# Patient Record
Sex: FEMALE | Race: WHITE | NOT HISPANIC OR LATINO | Employment: FULL TIME | URBAN - METROPOLITAN AREA
[De-identification: names, ages, dates, MRNs, and addresses within clinical notes are randomized per-mention and may not be internally consistent; named-entity substitution may affect disease eponyms.]

---

## 2023-03-30 ENCOUNTER — HOSPITAL ENCOUNTER (OUTPATIENT)
Dept: DATA CONVERSION | Facility: HOSPITAL | Age: 33
End: 2023-03-30
Attending: INTERNAL MEDICINE

## 2023-03-30 DIAGNOSIS — D12.8 BENIGN NEOPLASM OF RECTUM: ICD-10-CM

## 2023-03-30 DIAGNOSIS — K63.5 POLYP OF COLON: ICD-10-CM

## 2023-03-30 DIAGNOSIS — D12.2 BENIGN NEOPLASM OF ASCENDING COLON: ICD-10-CM

## 2023-03-30 DIAGNOSIS — F41.9 ANXIETY DISORDER, UNSPECIFIED: ICD-10-CM

## 2023-03-30 DIAGNOSIS — F32.A DEPRESSION, UNSPECIFIED: ICD-10-CM

## 2023-03-30 DIAGNOSIS — K64.8 OTHER HEMORRHOIDS: ICD-10-CM

## 2023-03-30 DIAGNOSIS — D12.5 BENIGN NEOPLASM OF SIGMOID COLON: ICD-10-CM

## 2023-03-30 DIAGNOSIS — D12.0 BENIGN NEOPLASM OF CECUM: ICD-10-CM

## 2023-03-30 DIAGNOSIS — G62.9 POLYNEUROPATHY, UNSPECIFIED: ICD-10-CM

## 2023-05-01 LAB
COMPLETE PATHOLOGY REPORT: NORMAL
CONVERTED CLINICAL DIAGNOSIS-HISTORY: NORMAL
CONVERTED FINAL DIAGNOSIS: NORMAL
CONVERTED FINAL REPORT PDF LINK TO COPY AND PASTE: NORMAL
CONVERTED GROSS DESCRIPTION: NORMAL

## 2023-08-23 PROBLEM — R00.0 TACHYCARDIA: Status: ACTIVE | Noted: 2023-08-23

## 2023-08-23 PROBLEM — R30.0 DYSURIA: Status: ACTIVE | Noted: 2023-08-23

## 2023-08-23 PROBLEM — K62.5 RECTAL BLEEDING: Status: ACTIVE | Noted: 2023-08-23

## 2023-08-23 PROBLEM — J30.9 ALLERGIC RHINITIS: Status: ACTIVE | Noted: 2023-08-23

## 2023-08-23 PROBLEM — F41.1 GENERALIZED ANXIETY DISORDER: Status: ACTIVE | Noted: 2023-08-23

## 2023-08-23 PROBLEM — E87.6 HYPOKALEMIA: Status: ACTIVE | Noted: 2023-08-23

## 2023-08-23 PROBLEM — N89.8 VAGINAL DISCHARGE: Status: ACTIVE | Noted: 2023-08-23

## 2023-08-23 PROBLEM — K64.4 EXTERNAL HEMORRHOID: Status: ACTIVE | Noted: 2023-08-23

## 2023-08-23 PROBLEM — R20.2 PARESTHESIAS: Status: ACTIVE | Noted: 2023-08-23

## 2023-08-23 PROBLEM — E55.9 VITAMIN D DEFICIENCY: Status: ACTIVE | Noted: 2023-08-23

## 2023-08-23 RX ORDER — ACETAMINOPHEN 325 MG/1
3 TABLET ORAL EVERY 6 HOURS
COMMUNITY
Start: 2021-12-08 | End: 2024-05-01 | Stop reason: ALTCHOICE

## 2023-08-23 RX ORDER — SODIUM FLUORIDE/POTASSIUM NIT 1.1 %-5 %
PASTE (ML) DENTAL
COMMUNITY
Start: 2022-10-19 | End: 2024-05-01 | Stop reason: ALTCHOICE

## 2023-08-23 RX ORDER — CETIRIZINE HYDROCHLORIDE 10 MG/1
1 TABLET ORAL DAILY
COMMUNITY

## 2023-08-23 RX ORDER — PSYLLIUM HUSK 0.4 G
CAPSULE ORAL
COMMUNITY
End: 2024-05-01 | Stop reason: ALTCHOICE

## 2023-08-23 RX ORDER — L.ACID,FERM,PLA,RHA/B.BIF,LONG 126 MG
TABLET, DELAYED AND EXTENDED RELEASE ORAL
COMMUNITY
Start: 2022-09-20 | End: 2024-05-01 | Stop reason: ALTCHOICE

## 2023-08-23 RX ORDER — IBUPROFEN 600 MG/1
1 TABLET ORAL EVERY 6 HOURS
COMMUNITY
Start: 2021-12-08 | End: 2024-05-01 | Stop reason: ALTCHOICE

## 2023-10-02 ENCOUNTER — TELEPHONE (OUTPATIENT)
Dept: GASTROENTEROLOGY | Facility: HOSPITAL | Age: 33
End: 2023-10-02
Payer: COMMERCIAL

## 2023-10-03 ENCOUNTER — APPOINTMENT (OUTPATIENT)
Dept: GASTROENTEROLOGY | Facility: HOSPITAL | Age: 33
End: 2023-10-03
Payer: COMMERCIAL

## 2023-11-02 ENCOUNTER — PREP FOR PROCEDURE (OUTPATIENT)
Dept: GASTROENTEROLOGY | Facility: HOSPITAL | Age: 33
End: 2023-11-02
Payer: COMMERCIAL

## 2023-11-08 ENCOUNTER — ANESTHESIA EVENT (OUTPATIENT)
Dept: GASTROENTEROLOGY | Facility: HOSPITAL | Age: 33
End: 2023-11-08
Payer: COMMERCIAL

## 2023-11-08 NOTE — ANESTHESIA PREPROCEDURE EVALUATION
Patient: Leah Majano    Procedure Information       Date/Time: 11/09/23 0830    Scheduled providers: Charity Bacon MD; Parminder Francisco MD; ARLINE Bailey    Procedures:       EGD      COLONOSCOPY    Location: [facility]            Relevant Problems   GI   (+) Rectal bleeding      Neuro/Psych   (+) Generalized anxiety disorder       Clinical information reviewed:   Tobacco  Allergies  Meds   Med Hx  Surg Hx   Fam Hx  Soc Hx        NPO/Void Status  Carbonhydrate Drink Given Prior to Surgery? : N  Date of Last Liquid: 11/09/23  Time of Last Liquid: 0245  Date of Last Solid: 11/07/23  Time of Last Solid: 1900  Last Intake Type: Clear fluids  Time of Last Void: 0745           Past Medical History:   Diagnosis Date    Anxiety     Colon polyps     Hemorrhoids       Past Surgical History:   Procedure Laterality Date    BASAL CELL CARCINOMA EXCISION Left     rib cage    COLONOSCOPY  03/2023    x2    DENTAL SURGERY       Social History     Tobacco Use    Smoking status: Never    Smokeless tobacco: Never   Vaping Use    Vaping Use: Never used   Substance Use Topics    Alcohol use: Yes     Alcohol/week: 7.0 standard drinks of alcohol     Types: 7 Glasses of wine per week    Drug use: Never      Current Outpatient Medications   Medication Instructions    acetaminophen (Tylenol) 325 mg tablet 3 tablets, oral, Every 6 hours    cetirizine (ZyrTEC) 10 mg tablet 1 tablet, oral, Daily    ibuprofen 600 mg tablet 1 tablet, oral, Every 6 hours    L.acid,ferm,cornelius,rha-B.bif,long (Controlled Delivery Probiotic) 126 mg (2 billion cell) tablet,delayed and ext.release oral    PreviDent 5000 Enamel Protect 1.1-5 % paste use as directed    psyllium (MetamuciL) 0.4 gram capsule oral      No Known Allergies     Chemistry    Lab Results   Component Value Date/Time     06/07/2021 0930    K 3.5 06/07/2021 0930     (H) 06/07/2021 0930    CO2 20 (L) 06/07/2021 0930    BUN 11 12/07/2021 1412     "CREATININE 0.52 12/07/2021 1412    Lab Results   Component Value Date/Time    CALCIUM 8.8 06/07/2021 0930    ALKPHOS 136 (H) 02/12/2020 1438    AST 15 12/07/2021 1412    ALT 14 12/07/2021 1412    BILITOT 0.4 02/12/2020 1438          Lab Results   Component Value Date/Time    WBC 14.6 (H) 12/07/2021 1412    HGB 12.0 12/07/2021 1412    HCT 36.5 12/07/2021 1412     12/07/2021 1412     No results found for: \"PROTIME\", \"PTT\", \"INR\"  No results found for this or any previous visit (from the past 4464 hour(s)).  No results found for this or any previous visit from the past 1095 days.       Visit Vitals  /81   Pulse 105   Temp 37.2 °C (99 °F) (Temporal)   Resp 15   Ht 1.702 m (5' 7\")   Wt 75 kg (165 lb 5.5 oz)   SpO2 100%   BMI 25.90 kg/m²   Smoking Status Never   BSA 1.88 m²        Anesthesia Evaluation      No history of anesthetic complications   Airway   Mallampati: III  TM distance: >3 FB  Neck ROM: full  Dental - normal exam     Pulmonary     breath sounds clear to auscultation  Cardiovascular     Rhythm: regular  Rate: normal    Neuro/Psych      GI/Hepatic/Renal      Endo/Other    Abdominal  - normal exam     Other findings: Lower wire retainer                 Physical Exam    Airway  Mallampati: III  TM distance: >3 FB  Neck ROM: full     Cardiovascular   Rhythm: regular  Rate: normal     Dental - normal exam     Pulmonary   Breath sounds clear to auscultation     Abdominal - normal exam       Other findings: Lower wire retainer           Anesthesia Plan    ASA 2     MAC     intravenous induction   Anesthetic plan and risks discussed with patient.        "

## 2023-11-09 ENCOUNTER — ANESTHESIA (OUTPATIENT)
Dept: GASTROENTEROLOGY | Facility: HOSPITAL | Age: 33
End: 2023-11-09
Payer: COMMERCIAL

## 2023-11-09 ENCOUNTER — HOSPITAL ENCOUNTER (OUTPATIENT)
Dept: GASTROENTEROLOGY | Facility: HOSPITAL | Age: 33
Discharge: HOME | End: 2023-11-09
Payer: COMMERCIAL

## 2023-11-09 VITALS
HEIGHT: 67 IN | HEART RATE: 62 BPM | SYSTOLIC BLOOD PRESSURE: 106 MMHG | TEMPERATURE: 96.8 F | RESPIRATION RATE: 15 BRPM | BODY MASS INDEX: 25.95 KG/M2 | DIASTOLIC BLOOD PRESSURE: 69 MMHG | WEIGHT: 165.34 LBS | OXYGEN SATURATION: 100 %

## 2023-11-09 DIAGNOSIS — D12.6 BENIGN NEOPLASM OF COLON, UNSPECIFIED: Primary | ICD-10-CM

## 2023-11-09 LAB — PREGNANCY TEST URINE, POC: NEGATIVE

## 2023-11-09 PROCEDURE — A45378 PR COLONOSCOPY,DIAGNOSTIC: Performed by: NURSE ANESTHETIST, CERTIFIED REGISTERED

## 2023-11-09 PROCEDURE — 3700000001 HC GENERAL ANESTHESIA TIME - INITIAL BASE CHARGE

## 2023-11-09 PROCEDURE — 7100000010 HC PHASE TWO TIME - EACH INCREMENTAL 1 MINUTE

## 2023-11-09 PROCEDURE — A45378 PR COLONOSCOPY,DIAGNOSTIC: Performed by: ANESTHESIOLOGY

## 2023-11-09 PROCEDURE — 88305 TISSUE EXAM BY PATHOLOGIST: CPT | Performed by: PATHOLOGY

## 2023-11-09 PROCEDURE — 7100000009 HC PHASE TWO TIME - INITIAL BASE CHARGE

## 2023-11-09 PROCEDURE — 43239 EGD BIOPSY SINGLE/MULTIPLE: CPT | Performed by: INTERNAL MEDICINE

## 2023-11-09 PROCEDURE — 3700000002 HC GENERAL ANESTHESIA TIME - EACH INCREMENTAL 1 MINUTE

## 2023-11-09 PROCEDURE — 45380 COLONOSCOPY AND BIOPSY: CPT | Performed by: INTERNAL MEDICINE

## 2023-11-09 PROCEDURE — 88305 TISSUE EXAM BY PATHOLOGIST: CPT | Mod: TC,AHULAB | Performed by: INTERNAL MEDICINE

## 2023-11-09 PROCEDURE — 88305 TISSUE EXAM BY PATHOLOGIST: CPT | Mod: TC,SUR,AHULAB | Performed by: INTERNAL MEDICINE

## 2023-11-09 PROCEDURE — 2500000004 HC RX 250 GENERAL PHARMACY W/ HCPCS (ALT 636 FOR OP/ED): Performed by: NURSE ANESTHETIST, CERTIFIED REGISTERED

## 2023-11-09 PROCEDURE — 45385 COLONOSCOPY W/LESION REMOVAL: CPT | Performed by: INTERNAL MEDICINE

## 2023-11-09 RX ORDER — MIDAZOLAM HYDROCHLORIDE 1 MG/ML
INJECTION INTRAMUSCULAR; INTRAVENOUS AS NEEDED
Status: DISCONTINUED | OUTPATIENT
Start: 2023-11-09 | End: 2023-11-09

## 2023-11-09 RX ORDER — FENTANYL CITRATE 50 UG/ML
INJECTION, SOLUTION INTRAMUSCULAR; INTRAVENOUS AS NEEDED
Status: DISCONTINUED | OUTPATIENT
Start: 2023-11-09 | End: 2023-11-09

## 2023-11-09 RX ORDER — SODIUM CHLORIDE, SODIUM LACTATE, POTASSIUM CHLORIDE, CALCIUM CHLORIDE 600; 310; 30; 20 MG/100ML; MG/100ML; MG/100ML; MG/100ML
20 INJECTION, SOLUTION INTRAVENOUS CONTINUOUS
Status: DISCONTINUED | OUTPATIENT
Start: 2023-11-09 | End: 2023-11-10 | Stop reason: HOSPADM

## 2023-11-09 RX ORDER — PROPOFOL 10 MG/ML
INJECTION, EMULSION INTRAVENOUS CONTINUOUS PRN
Status: DISCONTINUED | OUTPATIENT
Start: 2023-11-09 | End: 2023-11-09

## 2023-11-09 RX ORDER — PROPOFOL 10 MG/ML
INJECTION, EMULSION INTRAVENOUS AS NEEDED
Status: DISCONTINUED | OUTPATIENT
Start: 2023-11-09 | End: 2023-11-09

## 2023-11-09 RX ADMIN — MIDAZOLAM HYDROCHLORIDE 2 MG: 1 INJECTION, SOLUTION INTRAMUSCULAR; INTRAVENOUS at 08:37

## 2023-11-09 RX ADMIN — PROPOFOL 200 MCG/KG/MIN: 10 INJECTION, EMULSION INTRAVENOUS at 08:42

## 2023-11-09 RX ADMIN — PROPOFOL 100 MG: 10 INJECTION, EMULSION INTRAVENOUS at 08:42

## 2023-11-09 RX ADMIN — SODIUM CHLORIDE, SODIUM LACTATE, POTASSIUM CHLORIDE, AND CALCIUM CHLORIDE: 600; 310; 30; 20 INJECTION, SOLUTION INTRAVENOUS at 08:54

## 2023-11-09 RX ADMIN — FENTANYL CITRATE 100 MCG: 50 INJECTION, SOLUTION INTRAMUSCULAR; INTRAVENOUS at 08:40

## 2023-11-09 RX ADMIN — SODIUM CHLORIDE, SODIUM LACTATE, POTASSIUM CHLORIDE, AND CALCIUM CHLORIDE: 600; 310; 30; 20 INJECTION, SOLUTION INTRAVENOUS at 08:35

## 2023-11-09 ASSESSMENT — PAIN SCALES - GENERAL
PAINLEVEL_OUTOF10: 0 - NO PAIN

## 2023-11-09 ASSESSMENT — COLUMBIA-SUICIDE SEVERITY RATING SCALE - C-SSRS
6. HAVE YOU EVER DONE ANYTHING, STARTED TO DO ANYTHING, OR PREPARED TO DO ANYTHING TO END YOUR LIFE?: NO
2. HAVE YOU ACTUALLY HAD ANY THOUGHTS OF KILLING YOURSELF?: NO
1. IN THE PAST MONTH, HAVE YOU WISHED YOU WERE DEAD OR WISHED YOU COULD GO TO SLEEP AND NOT WAKE UP?: NO

## 2023-11-09 ASSESSMENT — PAIN - FUNCTIONAL ASSESSMENT
PAIN_FUNCTIONAL_ASSESSMENT: 0-10

## 2023-11-09 NOTE — DISCHARGE INSTRUCTIONS

## 2023-11-09 NOTE — ANESTHESIA POSTPROCEDURE EVALUATION
Patient: Leah Majano    Procedure Summary       Date: 11/09/23 Room / Location: [facility]    Anesthesia Start: 0839 Anesthesia Stop: 0923    Procedures:       EGD      COLONOSCOPY Diagnosis:       Benign neoplasm of colon, unspecified      Benign neoplasm of colon, unspecified    Scheduled Providers: Charity Bacon MD; Parminder Francisco MD; ARLINE Bailey; Glo Chavez, RN; Marifer Lezama RN Responsible Provider: Parminder Francisco MD    Anesthesia Type: MAC ASA Status: 2            Anesthesia Type: MAC    Vitals Value Taken Time   /69 11/09/23 0953   Temp 36 °C (96.8 °F) 11/09/23 0922   Pulse 63 11/09/23 0953   Resp 15 11/09/23 0952   SpO2 100 % 11/09/23 0953   Vitals shown include unvalidated device data.    Anesthesia Post Evaluation    Patient location during evaluation: bedside  Patient participation: complete - patient participated  Level of consciousness: awake and alert  Pain management: satisfactory to patient  Airway patency: patent  Cardiovascular status: acceptable  Respiratory status: acceptable  Hydration status: acceptable  Comments: No significant nausea.  No significant complications noted in recovery.        No notable events documented.

## 2023-11-09 NOTE — H&P
"History Of Present Illness  Leah Majano is a 33 y.o. female presenting with MYH polyposis.      Prior colonoscopy with removal of multiple polyps, here for surveillance.     Past Medical History  Past Medical History:   Diagnosis Date    Anxiety     Colon polyps     Hemorrhoids        Surgical History  Past Surgical History:   Procedure Laterality Date    BASAL CELL CARCINOMA EXCISION Left     rib cage    COLONOSCOPY  03/2023    x2    DENTAL SURGERY          Social History  She reports that she has never smoked. She has never used smokeless tobacco. She reports current alcohol use of about 7.0 standard drinks of alcohol per week. She reports that she does not use drugs.    Family History  Family History   Problem Relation Name Age of Onset    Other (CARDIAC DISORDER) Father      Other (CEREBROVASCULAR ACCIDENT) Father          Allergies  Patient has no known allergies.    Review of Systems     Physical Exam     Last Recorded Vitals  Blood pressure 129/81, pulse 105, temperature 37.2 °C (99 °F), temperature source Temporal, resp. rate 15, height 1.702 m (5' 7\"), weight 75 kg (165 lb 5.5 oz), SpO2 100 %.    Relevant Results       Assessment/Plan   Proceed with EGD and colonoscopy.        I spent 5 minutes in the professional and overall care of this patient.      Charity Bacon MD    "

## 2023-11-10 ASSESSMENT — PAIN SCALES - GENERAL: PAINLEVEL_OUTOF10: 0 - NO PAIN

## 2023-11-21 LAB
LABORATORY COMMENT REPORT: NORMAL
PATH REPORT.FINAL DX SPEC: NORMAL
PATH REPORT.GROSS SPEC: NORMAL
PATH REPORT.TOTAL CANCER: NORMAL
RESIDENT REVIEW: NORMAL

## 2024-01-31 PROBLEM — Z01.419 WELL WOMAN EXAM WITH ROUTINE GYNECOLOGICAL EXAM: Status: ACTIVE | Noted: 2024-01-31

## 2024-01-31 NOTE — PROGRESS NOTES
Subjective   Patient ID: Leah Majano is a 33 y.o. female who presents for Annual Exam.  She was last seen 2/9/2022 at postpartum visit after vaginal delivery.  She has genetic mutation that increases her risk of ovarian cancer. She is s/p genetics consult and is also at increased risk for breast and hyroid cancer. She would like referral to high risk breast clinic for consultation. After discussion she declines OCP as vasectomy is her contraception and she has used OCP for greater than 5 years in the past.           Review of Systems   Constitutional:  Negative for activity change.   HENT:  Negative for congestion.    Respiratory:  Negative for apnea and cough.    Cardiovascular:  Negative for chest pain.   Gastrointestinal:  Negative for constipation and diarrhea.   Genitourinary:  Negative for hematuria and vaginal pain.   Musculoskeletal:  Negative for joint swelling.   Neurological:  Negative for dizziness.   Psychiatric/Behavioral:  Negative for agitation.        Past Medical History:   Diagnosis Date    Anxiety     Colon polyps     Hemorrhoids     MYH-associated polyposis (MAP)       Past Surgical History:   Procedure Laterality Date    BASAL CELL CARCINOMA EXCISION Left     rib cage    COLONOSCOPY  03/2023    x2    DENTAL SURGERY        No Known Allergies   Current Outpatient Medications on File Prior to Visit   Medication Sig Dispense Refill    acetaminophen (Tylenol) 325 mg tablet Take 3 tablets (975 mg) by mouth every 6 hours.      cetirizine (ZyrTEC) 10 mg tablet Take 1 tablet (10 mg) by mouth once daily.      ibuprofen 600 mg tablet Take 1 tablet (600 mg) by mouth every 6 hours.      L.acid,ferm,cornelius,rha-B.bif,long (Controlled Delivery Probiotic) 126 mg (2 billion cell) tablet,delayed and ext.release Take by mouth.      PreviDent 5000 Enamel Protect 1.1-5 % paste use as directed      psyllium (MetamuciL) 0.4 gram capsule Take by mouth.       No current facility-administered medications on file  prior to visit.        Objective   Physical Exam  Constitutional:       Appearance: Normal appearance.   Neck:      Thyroid: No thyromegaly.   Cardiovascular:      Rate and Rhythm: Normal rate and regular rhythm.      Heart sounds: Normal heart sounds.   Pulmonary:      Effort: Pulmonary effort is normal.      Breath sounds: Normal breath sounds.   Chest:      Chest wall: No mass.   Breasts:     Right: Normal. No inverted nipple, mass, nipple discharge or skin change.      Left: Normal. No inverted nipple, mass, nipple discharge or skin change.   Abdominal:      General: There is no distension.      Palpations: Abdomen is soft. There is no mass.      Tenderness: There is no abdominal tenderness.   Genitourinary:     General: Normal vulva.      Exam position: Lithotomy position.      Labia:         Right: No rash.         Left: No rash.       Vagina: Normal. No lesions.      Cervix: No friability or lesion.      Uterus: Normal. Not enlarged and not tender.       Adnexa: Right adnexa normal and left adnexa normal.        Right: No mass or tenderness.          Left: No mass or tenderness.     Musculoskeletal:         General: No deformity.      Cervical back: Neck supple.   Lymphadenopathy:      Cervical: No cervical adenopathy.   Skin:     General: Skin is warm and dry.      Findings: No rash.   Neurological:      General: No focal deficit present.      Mental Status: She is alert.   Psychiatric:         Mood and Affect: Mood normal.         Behavior: Behavior is cooperative.         Thought Content: Thought content normal.           Problem List Items Addressed This Visit       Well woman exam with routine gynecological exam - Primary    Overview     Pap and HPV were negative 12/18/2020.         Current Assessment & Plan     Pap was sent with HPV.   Regular exercise and attaining/maintaining a healthy weight is encouraged.   Adequate calcium intake with diet or supplements is encouraged.    We will notify of any  abnormal results.          Increased risk of breast cancer    Overview     Increased risk of breast cancer due to MYH-associated polyposis genetic mutation.          Current Assessment & Plan     Referral is placed to high risk breast clinic.         Relevant Orders    Referral to Breast Surgery

## 2024-01-31 NOTE — ASSESSMENT & PLAN NOTE
Pap was sent with HPV.   Regular exercise and attaining/maintaining a healthy weight is encouraged.   Adequate calcium intake with diet or supplements is encouraged.    We will notify of any abnormal results.

## 2024-02-08 ENCOUNTER — OFFICE VISIT (OUTPATIENT)
Dept: OBSTETRICS AND GYNECOLOGY | Facility: CLINIC | Age: 34
End: 2024-02-08
Payer: COMMERCIAL

## 2024-02-08 VITALS — BODY MASS INDEX: 27.25 KG/M2 | WEIGHT: 174 LBS

## 2024-02-08 DIAGNOSIS — Z01.419 WELL WOMAN EXAM WITH ROUTINE GYNECOLOGICAL EXAM: Primary | ICD-10-CM

## 2024-02-08 DIAGNOSIS — Z91.89 INCREASED RISK OF BREAST CANCER: ICD-10-CM

## 2024-02-08 PROCEDURE — 87624 HPV HI-RISK TYP POOLED RSLT: CPT

## 2024-02-08 PROCEDURE — 1036F TOBACCO NON-USER: CPT | Performed by: OBSTETRICS & GYNECOLOGY

## 2024-02-08 PROCEDURE — 88175 CYTOPATH C/V AUTO FLUID REDO: CPT

## 2024-02-08 PROCEDURE — 99395 PREV VISIT EST AGE 18-39: CPT | Performed by: OBSTETRICS & GYNECOLOGY

## 2024-02-08 PROCEDURE — 88141 CYTOPATH C/V INTERPRET: CPT | Performed by: PATHOLOGY

## 2024-02-08 ASSESSMENT — ENCOUNTER SYMPTOMS
CONSTIPATION: 0
COUGH: 0
JOINT SWELLING: 0
APNEA: 0
AGITATION: 0
ACTIVITY CHANGE: 0
DIZZINESS: 0
DIARRHEA: 0
HEMATURIA: 0

## 2024-05-01 ENCOUNTER — LAB (OUTPATIENT)
Dept: LAB | Facility: LAB | Age: 34
End: 2024-05-01
Payer: COMMERCIAL

## 2024-05-01 ENCOUNTER — OFFICE VISIT (OUTPATIENT)
Dept: PRIMARY CARE | Facility: CLINIC | Age: 34
End: 2024-05-01
Payer: COMMERCIAL

## 2024-05-01 VITALS
BODY MASS INDEX: 27.31 KG/M2 | HEIGHT: 67 IN | OXYGEN SATURATION: 99 % | WEIGHT: 174 LBS | HEART RATE: 91 BPM | DIASTOLIC BLOOD PRESSURE: 84 MMHG | SYSTOLIC BLOOD PRESSURE: 122 MMHG

## 2024-05-01 DIAGNOSIS — F41.1 GAD (GENERALIZED ANXIETY DISORDER): Primary | ICD-10-CM

## 2024-05-01 DIAGNOSIS — D12.6 MYH-ASSOCIATED COLONIC POLYPOSIS (MAP): ICD-10-CM

## 2024-05-01 DIAGNOSIS — Z13.220 ENCOUNTER FOR SCREENING FOR LIPID DISORDER: ICD-10-CM

## 2024-05-01 DIAGNOSIS — Z13.0 SCREENING FOR ENDOCRINE, METABOLIC AND IMMUNITY DISORDER: ICD-10-CM

## 2024-05-01 DIAGNOSIS — Z13.228 SCREENING FOR ENDOCRINE, METABOLIC AND IMMUNITY DISORDER: ICD-10-CM

## 2024-05-01 DIAGNOSIS — Z13.29 SCREENING FOR ENDOCRINE, METABOLIC AND IMMUNITY DISORDER: ICD-10-CM

## 2024-05-01 DIAGNOSIS — Z86.010 HX OF COLONIC POLYPS: ICD-10-CM

## 2024-05-01 PROBLEM — Z86.0100 HX OF COLONIC POLYPS: Status: ACTIVE | Noted: 2024-05-01

## 2024-05-01 PROBLEM — Z86.0100 HX OF COLONIC POLYPS: Chronic | Status: ACTIVE | Noted: 2024-05-01

## 2024-05-01 LAB
ALBUMIN SERPL BCP-MCNC: 4.4 G/DL (ref 3.4–5)
ALP SERPL-CCNC: 45 U/L (ref 33–110)
ALT SERPL W P-5'-P-CCNC: 9 U/L (ref 7–45)
ANION GAP SERPL CALC-SCNC: 10 MMOL/L (ref 10–20)
AST SERPL W P-5'-P-CCNC: 13 U/L (ref 9–39)
BASOPHILS # BLD AUTO: 0.04 X10*3/UL (ref 0–0.1)
BASOPHILS NFR BLD AUTO: 0.6 %
BILIRUB SERPL-MCNC: 0.6 MG/DL (ref 0–1.2)
BUN SERPL-MCNC: 11 MG/DL (ref 6–23)
CALCIUM SERPL-MCNC: 9 MG/DL (ref 8.6–10.3)
CHLORIDE SERPL-SCNC: 103 MMOL/L (ref 98–107)
CHOLEST SERPL-MCNC: 176 MG/DL (ref 0–199)
CHOLESTEROL/HDL RATIO: 2.3
CO2 SERPL-SCNC: 27 MMOL/L (ref 21–32)
CREAT SERPL-MCNC: 0.73 MG/DL (ref 0.5–1.05)
EGFRCR SERPLBLD CKD-EPI 2021: >90 ML/MIN/1.73M*2
EOSINOPHIL # BLD AUTO: 0.15 X10*3/UL (ref 0–0.7)
EOSINOPHIL NFR BLD AUTO: 2.2 %
ERYTHROCYTE [DISTWIDTH] IN BLOOD BY AUTOMATED COUNT: 13.7 % (ref 11.5–14.5)
GLUCOSE SERPL-MCNC: 80 MG/DL (ref 74–99)
HCT VFR BLD AUTO: 41.4 % (ref 36–46)
HDLC SERPL-MCNC: 77 MG/DL
HGB BLD-MCNC: 13 G/DL (ref 12–16)
IMM GRANULOCYTES # BLD AUTO: 0.03 X10*3/UL (ref 0–0.7)
IMM GRANULOCYTES NFR BLD AUTO: 0.4 % (ref 0–0.9)
LDLC SERPL CALC-MCNC: 87 MG/DL
LYMPHOCYTES # BLD AUTO: 2.49 X10*3/UL (ref 1.2–4.8)
LYMPHOCYTES NFR BLD AUTO: 36 %
MCH RBC QN AUTO: 28.4 PG (ref 26–34)
MCHC RBC AUTO-ENTMCNC: 31.4 G/DL (ref 32–36)
MCV RBC AUTO: 90 FL (ref 80–100)
MONOCYTES # BLD AUTO: 0.44 X10*3/UL (ref 0.1–1)
MONOCYTES NFR BLD AUTO: 6.4 %
NEUTROPHILS # BLD AUTO: 3.76 X10*3/UL (ref 1.2–7.7)
NEUTROPHILS NFR BLD AUTO: 54.4 %
NON HDL CHOLESTEROL: 99 MG/DL (ref 0–149)
NRBC BLD-RTO: 0 /100 WBCS (ref 0–0)
PLATELET # BLD AUTO: 224 X10*3/UL (ref 150–450)
POTASSIUM SERPL-SCNC: 4.1 MMOL/L (ref 3.5–5.3)
PROT SERPL-MCNC: 7.4 G/DL (ref 6.4–8.2)
RBC # BLD AUTO: 4.58 X10*6/UL (ref 4–5.2)
SODIUM SERPL-SCNC: 136 MMOL/L (ref 136–145)
TRIGL SERPL-MCNC: 62 MG/DL (ref 0–149)
TSH SERPL-ACNC: 1.41 MIU/L (ref 0.44–3.98)
VIT B12 SERPL-MCNC: 220 PG/ML (ref 211–911)
VLDL: 12 MG/DL (ref 0–40)
WBC # BLD AUTO: 6.9 X10*3/UL (ref 4.4–11.3)

## 2024-05-01 PROCEDURE — 85025 COMPLETE CBC W/AUTO DIFF WBC: CPT

## 2024-05-01 PROCEDURE — 84443 ASSAY THYROID STIM HORMONE: CPT

## 2024-05-01 PROCEDURE — 80061 LIPID PANEL: CPT

## 2024-05-01 PROCEDURE — 80053 COMPREHEN METABOLIC PANEL: CPT

## 2024-05-01 PROCEDURE — 99214 OFFICE O/P EST MOD 30 MIN: CPT | Performed by: STUDENT IN AN ORGANIZED HEALTH CARE EDUCATION/TRAINING PROGRAM

## 2024-05-01 PROCEDURE — 82607 VITAMIN B-12: CPT

## 2024-05-01 PROCEDURE — 82652 VIT D 1 25-DIHYDROXY: CPT

## 2024-05-01 PROCEDURE — 83036 HEMOGLOBIN GLYCOSYLATED A1C: CPT

## 2024-05-01 PROCEDURE — 36415 COLL VENOUS BLD VENIPUNCTURE: CPT

## 2024-05-01 PROCEDURE — 1036F TOBACCO NON-USER: CPT | Performed by: STUDENT IN AN ORGANIZED HEALTH CARE EDUCATION/TRAINING PROGRAM

## 2024-05-01 ASSESSMENT — ENCOUNTER SYMPTOMS
DEPRESSION: 0
SHORTNESS OF BREATH: 0
FEVER: 0
OCCASIONAL FEELINGS OF UNSTEADINESS: 0
APPETITE CHANGE: 0
HEADACHES: 0
ACTIVITY CHANGE: 0
WHEEZING: 0
LOSS OF SENSATION IN FEET: 0
NERVOUS/ANXIOUS: 1
PALPITATIONS: 0

## 2024-05-01 ASSESSMENT — PATIENT HEALTH QUESTIONNAIRE - PHQ9
1. LITTLE INTEREST OR PLEASURE IN DOING THINGS: NOT AT ALL
2. FEELING DOWN, DEPRESSED OR HOPELESS: NOT AT ALL
SUM OF ALL RESPONSES TO PHQ9 QUESTIONS 1 AND 2: 0

## 2024-05-01 ASSESSMENT — ANXIETY QUESTIONNAIRES
1. FEELING NERVOUS, ANXIOUS, OR ON EDGE: NEARLY EVERY DAY
2. NOT BEING ABLE TO STOP OR CONTROL WORRYING: SEVERAL DAYS
6. BECOMING EASILY ANNOYED OR IRRITABLE: MORE THAN HALF THE DAYS
7. FEELING AFRAID AS IF SOMETHING AWFUL MIGHT HAPPEN: MORE THAN HALF THE DAYS
IF YOU CHECKED OFF ANY PROBLEMS ON THIS QUESTIONNAIRE, HOW DIFFICULT HAVE THESE PROBLEMS MADE IT FOR YOU TO DO YOUR WORK, TAKE CARE OF THINGS AT HOME, OR GET ALONG WITH OTHER PEOPLE: SOMEWHAT DIFFICULT
4. TROUBLE RELAXING: MORE THAN HALF THE DAYS
5. BEING SO RESTLESS THAT IT IS HARD TO SIT STILL: MORE THAN HALF THE DAYS
GAD7 TOTAL SCORE: 13
3. WORRYING TOO MUCH ABOUT DIFFERENT THINGS: SEVERAL DAYS

## 2024-05-01 NOTE — ASSESSMENT & PLAN NOTE
Triggers- her own health and her mother's health as well as work  Coping mechanism- walking her dogs is soothing to her   She has a therapist, currently taking a break was doing some couples counseling   Declines medication therapy at this time, she will let me know if symptoms progress or worsen and we can consider a low dose med  AVS with breathing exercises

## 2024-05-01 NOTE — PROGRESS NOTES
"Patient Name:  Leah Majano  MRN:  [id number]  :  1990    Subjective   Patient ID: Leah Majano is a 33 y.o. female who presents for Establish Care (Pt here to est care, discuss anxiety).    HPI     34 yo female presents to est care, former [facility] patient    Would like to discuss anxiety   Was prescribed medication in the past but was too anxious to try it     Office Visit from 2024 in [facility] with Mary Jo Jenkins, DO     2024     0810 Last Filed Value   Over the last 2 weeks, how often have you been bothered by any of the following problems?      Feeling nervous, anxious, or on edge Nearly every day Nearly every day   Not being able to stop or control worrying Several days Several days   Worrying too much about different things Several days Several days   Trouble relaxing More than half the days More than half the days   Being so restless that it is hard to sit still More than half the days More than half the days   Becoming easily annoyed or irritable More than half the days More than half the days   Feeling afraid as if something awful might happen More than half the days More than half the days   GORDON-7 Total Score 13 13   If you checked off any problems on this questionnaire,      How difficult have these problems made it for you to do your work, take care of things at home, or get along with other people? Somewhat difficult Somewhat difficult     Hx of polyps    Review of Systems   Constitutional:  Negative for activity change, appetite change and fever.   Respiratory:  Negative for shortness of breath and wheezing.    Cardiovascular:  Negative for chest pain, palpitations and leg swelling.   Allergic/Immunologic: Negative for immunocompromised state.   Neurological:  Negative for headaches.   Psychiatric/Behavioral:  The patient is nervous/anxious.      Objective   /84 (BP Location: Left arm, Patient Position: Sitting)   Pulse 91   Ht 1.702 m (5' 7\")  "  Wt 78.9 kg (174 lb)   SpO2 99%   BMI 27.25 kg/m²     Physical Exam  Constitutional:       Appearance: Normal appearance.   HENT:      Head: Normocephalic and atraumatic.      Right Ear: Tympanic membrane normal.      Left Ear: Tympanic membrane normal.      Nose: Nose normal. No congestion.   Eyes:      Extraocular Movements: Extraocular movements intact.   Cardiovascular:      Rate and Rhythm: Normal rate and regular rhythm.   Pulmonary:      Effort: Pulmonary effort is normal. No respiratory distress.   Musculoskeletal:      Cervical back: No tenderness.      Right lower leg: No edema.      Left lower leg: No edema.   Lymphadenopathy:      Cervical: No cervical adenopathy.   Skin:     Coloration: Skin is not jaundiced or pale.   Neurological:      General: No focal deficit present.      Mental Status: She is alert and oriented to person, place, and time.   Psychiatric:         Mood and Affect: Mood normal.         Behavior: Behavior normal.         Thought Content: Thought content normal.         Judgment: Judgment normal.     Assessment/Plan   Problem List Items Addressed This Visit             ICD-10-CM    Hx of colonic polyps (Chronic) Z86.010     Yearly plan for colonoscopy          GORDON (generalized anxiety disorder) - Primary (Chronic) F41.1     Triggers- her own health and her mother's health as well as work  Coping mechanism- walking her dogs is soothing to her   She has a therapist, currently taking a break was doing some couples counseling   Declines medication therapy at this time, she will let me know if symptoms progress or worsen and we can consider a low dose med  AVS with breathing exercises          Relevant Orders    Follow Up In [facility] - PCP - Established    MYH-associated colonic polyposis (MAP) (Chronic) D12.6     Genetic testing  Following with annual testing for colon cancer            Other Visit Diagnoses         Codes    Screening for endocrine, metabolic and immunity  disorder     Z13.29, Z13.228, Z13.0    Relevant Orders    Hemoglobin A1C    Comprehensive Metabolic Panel    Vitamin B12    TSH with reflex to Free T4 if abnormal    Vitamin D 1,25 Dihydroxy (for eval of hypercalcemia)    CBC and Auto Differential    Encounter for screening for lipid disorder     Z13.220    Relevant Orders    Lipid Panel

## 2024-05-01 NOTE — PATIENT INSTRUCTIONS
Breathing techniques for anxiety     It may seem too simple to help, but there's science behind it!    When we're stressed or upset, our sympathetic nervous system -- the fight/flight/freeze response -- activates. Breathing exercises engage our parasympathetic nervous system, which is what calms us down.    Diaphragmatic Breathing    When you first learn the diaphragmatic breathing technique, it may be easier for you to follow the instructions lying down, as shown above. As you gain more practice, you can try the diaphragmatic breathing technique while sitting in a chair, as shown below.    To perform this exercise while sitting in a chair:    -Sit comfortably, with your knees bent and your shoulders, head and neck relaxed.  -Place one hand on your upper chest and the other just below your rib cage. This will allow you to feel your diaphragm move as you breathe.  -Breathe in slowly through your nose so that your stomach moves out against your hand. The hand on your chest should remain as still as possible.  -Tighten your stomach muscles, letting them fall inward as you exhale through pursed lips. The hand on your upper chest must remain as still as possible.    Note: You may notice an increased effort will be needed to use the diaphragm correctly. At first, you'll probably get tired while doing this exercise. But keep at it, because with continued practice, diaphragmatic breathing will become easy and automatic.    2. Box Breathing    -Breathe out slowly, releasing all the air from your lungs.  -Breathe in through your nose as you slowly count to four in your head. Be conscious of how the air fills your lungs and stomach.  -Hold your breath for a count of four.  -Exhale for another count of four.  -Hold your breath again for a count of four.  -Repeat for three to four rounds.    How simple is that? In fact, box breathing's straightforwardness is what makes it so accessible -- and so impactful

## 2024-05-02 LAB
EST. AVERAGE GLUCOSE BLD GHB EST-MCNC: 103 MG/DL
HBA1C MFR BLD: 5.2 %

## 2024-05-03 LAB — 1,25(OH)2D3 SERPL-MCNC: 62 PG/ML (ref 19.9–79.3)

## 2024-05-06 ENCOUNTER — TELEPHONE (OUTPATIENT)
Dept: PRIMARY CARE | Facility: CLINIC | Age: 34
End: 2024-05-06
Payer: COMMERCIAL

## 2024-05-06 NOTE — TELEPHONE ENCOUNTER
----- Message from Mary Jo Jenkins DO sent at 5/6/2024  3:12 PM EDT -----  Labs looked good, no areas on concern identified. The only thing I might suggest is she is just barely in the normal for vitamin b12, a supplement of 500mcg daily may be good for energy levels.

## 2024-06-18 ENCOUNTER — APPOINTMENT (OUTPATIENT)
Dept: SURGERY | Facility: CLINIC | Age: 34
End: 2024-06-18
Payer: COMMERCIAL

## 2024-06-18 VITALS
HEIGHT: 67 IN | OXYGEN SATURATION: 99 % | HEART RATE: 91 BPM | SYSTOLIC BLOOD PRESSURE: 118 MMHG | WEIGHT: 174.4 LBS | BODY MASS INDEX: 27.37 KG/M2 | DIASTOLIC BLOOD PRESSURE: 83 MMHG

## 2024-06-18 DIAGNOSIS — Z12.31 ENCOUNTER FOR SCREENING MAMMOGRAM FOR BREAST CANCER: ICD-10-CM

## 2024-06-18 DIAGNOSIS — Z15.89 MONOALLELIC MUTATION OF MUTYH GENE: Primary | ICD-10-CM

## 2024-06-18 DIAGNOSIS — N64.52 NIPPLE DISCHARGE: ICD-10-CM

## 2024-06-18 PROCEDURE — 99204 OFFICE O/P NEW MOD 45 MIN: CPT | Performed by: SURGERY

## 2024-06-18 PROCEDURE — 1036F TOBACCO NON-USER: CPT | Performed by: SURGERY

## 2024-06-18 ASSESSMENT — ENCOUNTER SYMPTOMS
HEADACHES: 0
FATIGUE: 0
CONFUSION: 0
AGITATION: 0
BRUISES/BLEEDS EASILY: 0
SPEECH DIFFICULTY: 0
POLYPHAGIA: 0
ARTHRALGIAS: 0
DYSURIA: 0
NAUSEA: 0
SHORTNESS OF BREATH: 0
EYE REDNESS: 0
DIARRHEA: 0
FEVER: 0
ABDOMINAL PAIN: 0
MYALGIAS: 0
FLANK PAIN: 0
COUGH: 0
CONSTIPATION: 0
FREQUENCY: 0
EYE PAIN: 0
VOMITING: 0
HEMATURIA: 0
CHILLS: 0
WOUND: 0
WEAKNESS: 0

## 2024-06-18 NOTE — PATIENT INSTRUCTIONS
1.  You will be due for your yearly mammogram on 7/29/2024.  Dr. Bueno will call you with these results.  2.  Monthly, do your self breast exams.  If you identify any abnormalities, please contact Dr. Bueno's office.  [phone]

## 2024-06-18 NOTE — PROGRESS NOTES
GENERAL SURGERY OFFICE NOTE    Patient: Leah Majano    Age: 33 y.o.   Gender: female    MRN: [id number]    PCP: Mary Jo Jenkins DO        SUBJECTIVE     Chief Complaint  New Patient Visit (Patient was referred by Ronit Gonzalez MD for increased risk of breast cancer. She was tested for a genetics condition called MAPS. Patient denies any pain or discomfort associated with breasts. Patient does report having some milk discharge after being done breast feeding for over 2 years. )       ANKUSH Lynn is a 33-year-old white female who I am seeing in consultation at the request of her gynecologist for evaluation for possible increased risk of breast cancer.  She was having rectal bleeding for more than 2 years, and eventually underwent a colonoscopy demonstrating multiple colonic polyps.  She reports actually having 3 colonoscopies within the last year to remove all of the polyps.  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  She has been seen by colorectal surgery for monitoring of MUTYH-associated polyposis (MAP).  With respect to her breast, she has no palpable masses, tenderness or skin changes.  She does occasionally have bilateral milk like nipple discharge with stimulation only.  Her last mammogram was 11 months ago and was negative for any concerns.  She did undergo genetic testing, but this did not include the BRCA gene.    Risk factors for breast cancer: 33-year-old white female; menarche at age 12/12; first live birth at age 29; no previous breast biopsy; no first-degree relative with breast cancer.  She is premenopausal.  Her mother had kidney cancer.  She has a maternal cousin once removed who had breast cancer in her 60s.  Maternal uncle had testicular cancer at age 37.  Maternal grandfather and maternal uncle both had prostate cancer.  This gives her a 5-year Carlotta score of 0.4% and  a lifetime risk of 13.9% which puts her in an above average risk category.    ROS  Review of Systems   Constitutional:  Negative for chills, fatigue and fever.   HENT:  Negative for congestion, ear pain and hearing loss.    Eyes:  Negative for pain and redness.   Respiratory:  Negative for cough and shortness of breath.    Cardiovascular:  Negative for chest pain and leg swelling.   Gastrointestinal:  Negative for abdominal pain, constipation, diarrhea, nausea and vomiting.   Endocrine: Negative for polyphagia.   Genitourinary:  Negative for dysuria, flank pain, frequency and hematuria.   Musculoskeletal:  Negative for arthralgias and myalgias.   Skin:  Negative for rash and wound.   Allergic/Immunologic: Negative for immunocompromised state.   Neurological:  Negative for speech difficulty, weakness and headaches.   Hematological:  Does not bruise/bleed easily.   Psychiatric/Behavioral:  Negative for agitation and confusion.           HISTORY     Past Medical History:   Diagnosis Date    Anxiety     Colon polyps     Hemorrhoids     MYH-associated polyposis (MAP)         Past Surgical History:   Procedure Laterality Date    BASAL CELL CARCINOMA EXCISION Left     rib cage    COLONOSCOPY  03/2023    x2    DENTAL SURGERY          Family History   Problem Relation Name Age of Onset    Other (CARDIAC DISORDER) Father      Other (CEREBROVASCULAR ACCIDENT) Father          No Known Allergies     Social History     Tobacco Use   Smoking Status Never   Smokeless Tobacco Never        Social History     Substance and Sexual Activity   Alcohol Use Yes    Alcohol/week: 7.0 standard drinks of alcohol    Types: 7 Glasses of wine per week        HOME MEDICATIONS  Current Outpatient Medications   Medication Instructions    cetirizine (ZyrTEC) 10 mg tablet 1 tablet, oral, Daily    cyanocobalamin/thiamine HCl (VITAMIN B12-VITAMIN B1 ORAL) oral    TURMERIC ORAL oral          OBJECTIVE   Last Recorded Vitals.  Blood pressure 118/83, pulse  "91, height 1.702 m (5' 7\"), weight 79.1 kg (174 lb 6.4 oz), SpO2 99%.     PHYSICAL EXAM  Physical Exam   General: Well-developed, well-nourished and in no acute distress.  Head: Normocephalic. Atraumatic.  Neck/thyroid: Neck is supple.   Eyes: Pupils equal round and reactive to light. Conjunctiva normal.  ENMT: No masses or deformity of external nose. External ears without masses.  Respiratory/Chest:  Normal respiratory effort.  Breast: Small breast, symmetrical.  Dense tissue throughout both breast without predominant mass.  No expressible nipple discharge on today's exam.  Lymphatics: No palpable lymphadenopathy of the cervical, supraclavicular or axillary regions.  Cardiovascular: Regular rate and rhythm.   Abdomen: Soft, nontender, nondistended.   Musculoskeletal: Joints and limbs are grossly normal. Normal gait. Normal range of motion of major joints.  Neuro: Oriented to person, place and time. No obvious neurological deficit. Motor strength grossly normal.  Psych: Normal mood and affect.    RESULTS   Labs  Scanned document of genetic testing results dated 5/4/2023 were reviewed.    Radiology Resutls  MAMM SCREENING W/ FAISAL;  7/28/2023 7:32 am     ACCESSION NUMBER(S):  [id number]     ORDERING CLINICIAN:  HAM ORDAZ     INDICATION:  Screening.     COMPARISON:     No prior examination available for comparison. If a prior examination  becomes available, this examination will be compared to the prior  study. Addendum report will be issued.     FINDINGS:  2D and tomosynthesis images were reviewed at 1 mm slice thickness.     Breast density: Scattered areas of fibroglandular density. No  suspicious masses or calcifications are identified.     .      Impression   No mammographic evidence of malignancy.     BI-RADS CATEGORY:  BI-RADS category 1-Negative.         ASSESSMENT / PLAN   Diagnoses and all orders for this visit:  Monoallelic mutation of MUTYH gene  Nipple discharge  -     Referral to Breast " Surgery  Encounter for screening mammogram for breast cancer  -     BI mammo bilateral screening tomosynthesis; Future      Plan  1.  The patient that although she has some above average risk factors for breast cancer, her lifetime Carlotta score is not greater than 20%.  Therefore, she does not qualify for high risk screening based off of this evaluation.  However, will contact genetics to review the testing that she has had done (no BRCA gene testing) with her family history to see if she qualifies for further genetic testing.  Can discuss this with her when the office calls her with her mammogram results.  2.  Since she does have above average risk for developing breast cancer, will continue with her yearly screening mammograms.  Her next bilateral screening mammogram is due 7/29/2024.  Will call her with the results.  3.  If she does not require any further testing, and her mammogram is without abnormality, can continue with her yearly screening mammograms.      Sushila Bueno MD, FACS  [facility] General Surgery  [address] [place] [address];   [address]  [place]  [address] [phone]

## 2024-06-18 NOTE — LETTER
June 18, 2024     Ronit Glaser MD  [id number]  [address] [place] [id number]    Patient: Leah Majano   YOB: 1990   Date of Visit: 6/18/2024       Dear Dr. Ronit Glaser MD:    Thank you for referring Leah Majano to me for evaluation. Below are my notes for this consultation.  If you have questions, please do not hesitate to call me. I look forward to following your patient along with you.       Sincerely,     Sushila Bueno MD      CC: No Recipients  ______________________________________________________________________________________        GENERAL SURGERY OFFICE NOTE    Patient: Leah Majano    Age: 33 y.o.   Gender: female    MRN: [id number]    PCP: Mary Jo Jenkins DO        SUBJECTIVE     Chief Complaint  New Patient Visit (Patient was referred by Ronit Gonzalez MD for increased risk of breast cancer. She was tested for a genetics condition called MAPS. Patient denies any pain or discomfort associated with breasts. Patient does report having some milk discharge after being done breast feeding for over 2 years. )       ANKUSH Lynn is a 33-year-old white female who I am seeing in consultation at the request of her gynecologist for evaluation for possible increased risk of breast cancer.  She was having rectal bleeding for more than 2 years, and eventually underwent a colonoscopy demonstrating multiple colonic polyps.  She reports actually having 3 colonoscopies within the last year to remove all of the polyps.  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  She has been seen by colorectal surgery for monitoring of MUTYH-associated polyposis (MAP).  With respect to her breast, she has no palpable masses, tenderness or skin changes.  She does occasionally have bilateral milk like nipple discharge with stimulation only.  Her last mammogram was  11 months ago and was negative for any concerns.  She did undergo genetic testing, but this did not include the BRCA gene.    Risk factors for breast cancer: 33-year-old white female; menarche at age [age]; first live birth at age 29; no previous breast biopsy; no first-degree relative with breast cancer.  She is premenopausal.  Her mother had kidney cancer.  She has a maternal cousin once removed who had breast cancer in her 60s.  Maternal uncle had testicular cancer at age 37.  Maternal grandfather and maternal uncle both had prostate cancer.  This gives her a 5-year Carlotta score of 0.4% and a lifetime risk of 13.9% which puts her in an above average risk category.    ROS  Review of Systems   Constitutional:  Negative for chills, fatigue and fever.   HENT:  Negative for congestion, ear pain and hearing loss.    Eyes:  Negative for pain and redness.   Respiratory:  Negative for cough and shortness of breath.    Cardiovascular:  Negative for chest pain and leg swelling.   Gastrointestinal:  Negative for abdominal pain, constipation, diarrhea, nausea and vomiting.   Endocrine: Negative for polyphagia.   Genitourinary:  Negative for dysuria, flank pain, frequency and hematuria.   Musculoskeletal:  Negative for arthralgias and myalgias.   Skin:  Negative for rash and wound.   Allergic/Immunologic: Negative for immunocompromised state.   Neurological:  Negative for speech difficulty, weakness and headaches.   Hematological:  Does not bruise/bleed easily.   Psychiatric/Behavioral:  Negative for agitation and confusion.           HISTORY     Past Medical History:   Diagnosis Date   • Anxiety    • Colon polyps    • Hemorrhoids    • MYH-associated polyposis (MAP)         Past Surgical History:   Procedure Laterality Date   • BASAL CELL CARCINOMA EXCISION Left     rib cage   • COLONOSCOPY  03/2023    x2   • DENTAL SURGERY          Family History   Problem Relation Name Age of Onset   • Other (CARDIAC DISORDER) Father     •  "Other (CEREBROVASCULAR ACCIDENT) Father          No Known Allergies     Social History     Tobacco Use   Smoking Status Never   Smokeless Tobacco Never        Social History     Substance and Sexual Activity   Alcohol Use Yes   • Alcohol/week: 7.0 standard drinks of alcohol   • Types: 7 Glasses of wine per week        HOME MEDICATIONS  Current Outpatient Medications   Medication Instructions   • cetirizine (ZyrTEC) 10 mg tablet 1 tablet, oral, Daily   • cyanocobalamin/thiamine HCl (VITAMIN B12-VITAMIN B1 ORAL) oral   • TURMERIC ORAL oral          OBJECTIVE   Last Recorded Vitals.  Blood pressure 118/83, pulse 91, height 1.702 m (5' 7\"), weight 79.1 kg (174 lb 6.4 oz), SpO2 99%.     PHYSICAL EXAM  Physical Exam   General: Well-developed, well-nourished and in no acute distress.  Head: Normocephalic. Atraumatic.  Neck/thyroid: Neck is supple.   Eyes: Pupils equal round and reactive to light. Conjunctiva normal.  ENMT: No masses or deformity of external nose. External ears without masses.  Respiratory/Chest:  Normal respiratory effort.  Breast: Small breast, symmetrical.  Dense tissue throughout both breast without predominant mass.  No expressible nipple discharge on today's exam.  Lymphatics: No palpable lymphadenopathy of the cervical, supraclavicular or axillary regions.  Cardiovascular: Regular rate and rhythm.   Abdomen: Soft, nontender, nondistended.   Musculoskeletal: Joints and limbs are grossly normal. Normal gait. Normal range of motion of major joints.  Neuro: Oriented to person, place and time. No obvious neurological deficit. Motor strength grossly normal.  Psych: Normal mood and affect.    RESULTS   Labs  Scanned document of genetic testing results dated 5/4/2023 were reviewed.    Radiology Resutls  MAMM SCREENING W/ FAISAL;  7/28/2023 7:32 am     ACCESSION NUMBER(S):  [id number]     ORDERING CLINICIAN:  HAM ORDAZ     INDICATION:  Screening.     COMPARISON:     No prior examination available for " comparison. If a prior examination  becomes available, this examination will be compared to the prior  study. Addendum report will be issued.     FINDINGS:  2D and tomosynthesis images were reviewed at 1 mm slice thickness.     Breast density: Scattered areas of fibroglandular density. No  suspicious masses or calcifications are identified.     .      Impression   No mammographic evidence of malignancy.     BI-RADS CATEGORY:  BI-RADS category 1-Negative.         ASSESSMENT / PLAN   Diagnoses and all orders for this visit:  Monoallelic mutation of MUTYH gene  Nipple discharge  -     Referral to Breast Surgery  Encounter for screening mammogram for breast cancer  -     BI mammo bilateral screening tomosynthesis; Future      Plan  1.  The patient that although she has some above average risk factors for breast cancer, her lifetime Carlotta score is not greater than 20%.  Therefore, she does not qualify for high risk screening based off of this evaluation.  However, will contact genetics to review the testing that she has had done (no BRCA gene testing) with her family history to see if she qualifies for further genetic testing.  Can discuss this with her when the office calls her with her mammogram results.  2.  Since she does have above average risk for developing breast cancer, will continue with her yearly screening mammograms.  Her next bilateral screening mammogram is due 7/29/2024.  Will call her with the results.  3.  If she does not require any further testing, and her mammogram is without abnormality, can continue with her yearly screening mammograms.      Sushila Bueno MD, FACS  [facility] General Surgery  [address] [place] [address];   [address]  [place]  [address] [phone]

## 2024-07-31 ENCOUNTER — TELEPHONE (OUTPATIENT)
Dept: SURGERY | Facility: CLINIC | Age: 34
End: 2024-07-31
Payer: COMMERCIAL

## 2024-07-31 ENCOUNTER — HOSPITAL ENCOUNTER (OUTPATIENT)
Dept: RADIOLOGY | Facility: HOSPITAL | Age: 34
Discharge: HOME | End: 2024-07-31
Payer: COMMERCIAL

## 2024-07-31 VITALS — HEIGHT: 67 IN | BODY MASS INDEX: 26.68 KG/M2 | WEIGHT: 170 LBS

## 2024-07-31 DIAGNOSIS — Z12.31 ENCOUNTER FOR SCREENING MAMMOGRAM FOR BREAST CANCER: ICD-10-CM

## 2024-07-31 DIAGNOSIS — R92.8 ABNORMALITY OF LEFT BREAST ON SCREENING MAMMOGRAM: Primary | ICD-10-CM

## 2024-07-31 PROCEDURE — 77063 BREAST TOMOSYNTHESIS BI: CPT | Performed by: RADIOLOGY

## 2024-07-31 PROCEDURE — 77067 SCR MAMMO BI INCL CAD: CPT

## 2024-07-31 PROCEDURE — 77067 SCR MAMMO BI INCL CAD: CPT | Performed by: RADIOLOGY

## 2024-07-31 NOTE — RESULT ENCOUNTER NOTE
Please, call patient and let her know that her recent mammogram showed an asymmetry of her left breast which needs a diagnostic mammogram for further evaluation.  However, the right breast is unchanged.  1.  Schedule for left diagnostic mammogram at next available  2.  Schedule for phone/virtual follow-up with Dr. Bueno a few days after the diagnostic mammogram.

## 2024-07-31 NOTE — TELEPHONE ENCOUNTER
----- Message from Sushila Bueno sent at 7/31/2024  2:52 PM EDT -----  Please, call patient and let her know that her recent mammogram showed an asymmetry of her left breast which needs a diagnostic mammogram for further evaluation.  However, the right breast is unchanged.  1.  Schedule for left diagnostic mammogram at next available  2.  Schedule for phone/virtual follow-up with Dr. Bueno a few days after the diagnostic mammogram.

## 2024-08-06 ENCOUNTER — HOSPITAL ENCOUNTER (OUTPATIENT)
Dept: RADIOLOGY | Facility: HOSPITAL | Age: 34
Discharge: HOME | End: 2024-08-06
Payer: COMMERCIAL

## 2024-08-06 DIAGNOSIS — R92.8 ABNORMALITY OF LEFT BREAST ON SCREENING MAMMOGRAM: ICD-10-CM

## 2024-08-06 PROCEDURE — 77061 BREAST TOMOSYNTHESIS UNI: CPT | Mod: LT

## 2024-08-06 PROCEDURE — 76642 ULTRASOUND BREAST LIMITED: CPT | Mod: LEFT SIDE

## 2024-08-06 PROCEDURE — 77061 BREAST TOMOSYNTHESIS UNI: CPT | Mod: LEFT SIDE

## 2024-08-06 PROCEDURE — 76642 ULTRASOUND BREAST LIMITED: CPT | Mod: LT

## 2024-08-06 PROCEDURE — 77065 DX MAMMO INCL CAD UNI: CPT | Mod: LEFT SIDE

## 2024-08-09 ENCOUNTER — APPOINTMENT (OUTPATIENT)
Dept: SURGERY | Facility: CLINIC | Age: 34
End: 2024-08-09
Payer: COMMERCIAL

## 2024-08-09 DIAGNOSIS — Z15.89 MONOALLELIC MUTATION OF MUTYH GENE: ICD-10-CM

## 2024-08-09 DIAGNOSIS — R92.8 ABNORMALITY OF LEFT BREAST ON SCREENING MAMMOGRAM: Primary | ICD-10-CM

## 2024-08-09 PROCEDURE — 99441 PR PHYS/QHP TELEPHONE EVALUATION 5-10 MIN: CPT | Performed by: SURGERY

## 2024-08-09 PROCEDURE — 1036F TOBACCO NON-USER: CPT | Performed by: SURGERY

## 2024-08-09 ASSESSMENT — ENCOUNTER SYMPTOMS
DYSURIA: 0
WEAKNESS: 0
CONFUSION: 0
EYE PAIN: 0
COUGH: 0
SHORTNESS OF BREATH: 0
VOMITING: 0
MYALGIAS: 0
CONSTIPATION: 0
ARTHRALGIAS: 0
NAUSEA: 0
FEVER: 0
POLYPHAGIA: 0
WOUND: 0
AGITATION: 0
BRUISES/BLEEDS EASILY: 0
EYE REDNESS: 0
FATIGUE: 0
SPEECH DIFFICULTY: 0
HEMATURIA: 0
DIARRHEA: 0
FLANK PAIN: 0
CHILLS: 0
ABDOMINAL PAIN: 0
HEADACHES: 0
FREQUENCY: 0

## 2024-08-09 NOTE — PATIENT INSTRUCTIONS
1.  The diagnostic/magnification view of the left breast showed partial disbursement of the asymmetry of the left breast and no persistent architectural distortion/mass.  Therefore, no biopsy is required.  However, we will continue to follow this area closely over the next 2 years.  Will schedule for a left diagnostic mammogram in 6 months, and follow-up in Dr. Bueno's office after this mammogram.  2.  Your MUTYH mutation was reviewed with a .  They did not feel that you required more frequent screening compared to the average population for breast cancer.  Therefore, we will continue with your yearly screening mammograms.  However, you should be following up with GI regarding your increased risk of colonic polyps.  3.  Continue with your monthly self breast exams.  If you identify any abnormalities, please call Dr. Bueno's office immediately.  [phone]

## 2024-08-09 NOTE — PROGRESS NOTES
GENERAL SURGERY OFFICE NOTE    Patient: Leah Majano    Age: 33 y.o.   Gender: female    MRN: [id number]    PCP: Mary Jo Jenkins, DO        SUBJECTIVE     Chief Complaint  Follow-up (Patient is following up after her mammogram. Patient state no new problems. )       HPI  The patient was interviewed via a phone. We spent approximately 6 minutes in the phone communication. The patient gave consent for this type of visit.  I performed this visit using real-time telehealth tools, including a telephone connection between Leah at home and myself / Dr. Bueno at the [facility] office.    Leah returns to the office via phone visit for follow-up after mammographic evaluation.  At her last office visit, she was sent for her bilateral screening mammogram.  There was no abnormality identified on the right side.  However, an area of asymmetry/architectural distortion was identified on the left breast.  She was scheduled for a diagnostic left breast which showed partial resolution of the asymmetry and was felt to be benign.  Short-term follow-up was recommended.  She currently denies any breast complaints.  No new palpable masses or skin changes.  No nipple discharge.    Risk factors for breast cancer: 33-year-old white female; menarche at age [age]; first live birth at age 29; no previous breast biopsy; no first-degree relative with breast cancer.  She is premenopausal.  Her mother had kidney cancer.  She has a maternal cousin once removed who had breast cancer in her 60s.  Maternal uncle had testicular cancer at age 37.  Maternal grandfather and maternal uncle both had prostate cancer.  This gives her a 5-year Carlotta score of 0.4% and a lifetime risk of 13.9% which puts her in an above average risk category.    ROS  Review of Systems   Constitutional:  Negative for chills, fatigue and fever.   HENT:  Negative for congestion, ear pain and hearing loss.    Eyes:  Negative for pain and redness.   Respiratory:   Negative for cough and shortness of breath.    Cardiovascular:  Negative for chest pain and leg swelling.   Gastrointestinal:  Negative for abdominal pain, constipation, diarrhea, nausea and vomiting.   Endocrine: Negative for polyphagia.   Genitourinary:  Negative for dysuria, flank pain, frequency and hematuria.   Musculoskeletal:  Negative for arthralgias and myalgias.   Skin:  Negative for rash and wound.   Allergic/Immunologic: Negative for immunocompromised state.   Neurological:  Negative for speech difficulty, weakness and headaches.   Hematological:  Does not bruise/bleed easily.   Psychiatric/Behavioral:  Negative for agitation and confusion.           HISTORY     Past Medical History:   Diagnosis Date    Anxiety     Colon polyps     Hemorrhoids     MYH-associated polyposis (MAP)         Past Surgical History:   Procedure Laterality Date    BASAL CELL CARCINOMA EXCISION Left     rib cage    COLONOSCOPY  03/2023    x2    DENTAL SURGERY          Family History   Problem Relation Name Age of Onset    Other (CARDIAC DISORDER) Father Bipin     Other (CEREBROVASCULAR ACCIDENT) Father Bipin     Heart disease Father Bipin     Stroke Father Bipin     Cancer Mother Adela     Kidney disease Mother Adela         No Known Allergies     Social History     Tobacco Use   Smoking Status Never   Smokeless Tobacco Never        Social History     Substance and Sexual Activity   Alcohol Use Yes    Alcohol/week: 6.0 standard drinks of alcohol    Types: 2 Glasses of wine, 2 Cans of beer, 2 Shots of liquor per week        HOME MEDICATIONS  Current Outpatient Medications   Medication Instructions    cetirizine (ZyrTEC) 10 mg tablet 1 tablet, oral, Daily    cyanocobalamin/thiamine HCl (VITAMIN B12-VITAMIN B1 ORAL) oral    TURMERIC ORAL oral          OBJECTIVE   Last Recorded Vitals.  There were no vitals taken for this visit.     PHYSICAL EXAM  Physical Exam   Phone visit/previous exam:  General: Well-developed, well-nourished and in  no acute distress.  Head: Normocephalic. Atraumatic.  Neck/thyroid: Neck is supple.   Eyes: Pupils equal round and reactive to light. Conjunctiva normal.  ENMT: No masses or deformity of external nose. External ears without masses.  Respiratory/Chest:  Normal respiratory effort.  Breast: Small breast, symmetrical.  Dense tissue throughout both breast without predominant mass.  No expressible nipple discharge on today's exam.  Lymphatics: No palpable lymphadenopathy of the cervical, supraclavicular or axillary regions.  Cardiovascular: Regular rate and rhythm.   Abdomen: Soft, nontender, nondistended.   Musculoskeletal: Joints and limbs are grossly normal. Normal gait. Normal range of motion of major joints.  Neuro: Oriented to person, place and time. No obvious neurological deficit. Motor strength grossly normal.  Psych: Normal mood and affect.    RESULTS   Labs  Scanned document of genetic testing results dated 5/4/2023 were reviewed.    Radiology Resutls  MAMMO LEFT DIAGNOSTIC TOMOSYNTHESIS; BI US BREAST LIMITED LEFT;  8/6/2024 11:45 am; 8/6/2024 12:04 pm      ACCESSION NUMBER(S):  [id number]; [id number]      ORDERING CLINICIAN:  GEMINI TAYLOR      INDICATION:  Signs/Symptoms:Left breast asymmetry on screening mammogram;  Signs/Symptoms:abn screen.          COMPARISON:  07/31/2024 and 07/28/2023      FINDINGS:  2D and tomosynthesis images were reviewed at 1 mm slice thickness.      Density:  The breast tissue is heterogeneously dense, which may  obscure small masses.      Asymmetry from the prior screening study partially disperses with  spot compression in the architectural distortion is not reproduced.  No associated calcifications are noted.      Ultrasound survey through the area of the left breast correlating  with the asymmetry on prior tomography fails to demonstrate any focal  mass or cyst. Survey in the left axilla shows no adenopathy.      IMPRESSION:  Mammographic asymmetry partially disperses  and there is no persistent  architectural distortion on the craniocaudal spot image. This is  probably an area of asymmetric tissue. Short interval follow-up  mammography is recommended as a safeguard.      BI-RADS CATEGORY:      BI-RADS Category:  3 Probably Benign.  Recommendation:  Short-term Interval Follow-up Imaging.  Recommended Date:  6 Months.  Laterality:  Left.      ASSESSMENT / PLAN   Diagnoses and all orders for this visit:  Abnormality of left breast on screening mammogram  -     BI mammo left diagnostic tomosynthesis; Future  Monoallelic mutation of MUTYH gene        Plan  1.  The patient that although she has some above average risk factors for breast cancer, her lifetime Carlotta score is not greater than 20%.  Therefore, she does not qualify for high risk screening based off of this evaluation.  She actually did have a full panel genetic testing which did include the BRCA gene and she is BRCA negative.  2.  She had undergone a screening mammogram which showed an architectural distortion/asymmetry of the left breast.  Diagnostic mammogram showed resolution of part of this asymmetry; therefore, it was felt to be benign.  Will do a short-term follow-up with a left diagnostic mammogram in 6 months and follow-up in the office after this mammogram.  3.  Since she does have higher than average risk for developing breast cancer, have encouraged her to continue doing her yearly screening mammograms.  4.  Reviewed her MUTYH mutation with genetics.  Response: Sounds like she is a carrier for MUTYH-associated polyposis (MAP)--a very common finding. MAP is a recessive condition and therefore requires two mutations to have affect. Not worried at all about breast cancer risk for MUTYH carriers.        Sushila Bueno MD, FACS  [facility] General Surgery  [address] [place] [address];   [address]  [place]  [address] [phone]

## 2024-08-12 ENCOUNTER — TELEPHONE (OUTPATIENT)
Dept: SURGERY | Facility: CLINIC | Age: 34
End: 2024-08-12
Payer: COMMERCIAL

## 2024-08-12 NOTE — TELEPHONE ENCOUNTER
Left message for patient to call the office. Mammogram is scheduled for 2/6/24 at [place] and office follow up is scheduled for 2/12/24. Information has also been mailed.

## 2024-08-26 ENCOUNTER — PATIENT MESSAGE (OUTPATIENT)
Dept: GASTROENTEROLOGY | Facility: HOSPITAL | Age: 34
End: 2024-08-26
Payer: COMMERCIAL

## 2024-08-26 DIAGNOSIS — D12.6 MYH-ASSOCIATED COLONIC POLYPOSIS (MAP): Chronic | ICD-10-CM

## 2024-08-27 DIAGNOSIS — Z12.11 SCREENING FOR COLON CANCER: ICD-10-CM

## 2024-08-27 RX ORDER — POLYETHYLENE GLYCOL 3350, SODIUM SULFATE ANHYDROUS, SODIUM BICARBONATE, SODIUM CHLORIDE, POTASSIUM CHLORIDE 236; 22.74; 6.74; 5.86; 2.97 G/4L; G/4L; G/4L; G/4L; G/4L
POWDER, FOR SOLUTION ORAL
Qty: 4000 ML | Refills: 0 | Status: SHIPPED | OUTPATIENT
Start: 2024-08-27

## 2024-11-14 ENCOUNTER — HOSPITAL ENCOUNTER (OUTPATIENT)
Dept: GASTROENTEROLOGY | Facility: HOSPITAL | Age: 34
Discharge: HOME | End: 2024-11-14
Payer: COMMERCIAL

## 2024-11-14 ENCOUNTER — APPOINTMENT (OUTPATIENT)
Dept: GASTROENTEROLOGY | Facility: HOSPITAL | Age: 34
End: 2024-11-14
Payer: COMMERCIAL

## 2024-11-14 VITALS
TEMPERATURE: 97.9 F | RESPIRATION RATE: 16 BRPM | HEIGHT: 67 IN | DIASTOLIC BLOOD PRESSURE: 81 MMHG | OXYGEN SATURATION: 100 % | WEIGHT: 173.06 LBS | SYSTOLIC BLOOD PRESSURE: 111 MMHG | HEART RATE: 62 BPM | BODY MASS INDEX: 27.16 KG/M2

## 2024-11-14 DIAGNOSIS — D12.6 MYH-ASSOCIATED COLONIC POLYPOSIS (MAP): Primary | ICD-10-CM

## 2024-11-14 LAB — PREGNANCY TEST URINE, POC: NEGATIVE

## 2024-11-14 PROCEDURE — 7100000010 HC PHASE TWO TIME - EACH INCREMENTAL 1 MINUTE

## 2024-11-14 PROCEDURE — 81025 URINE PREGNANCY TEST: CPT | Performed by: COLON & RECTAL SURGERY

## 2024-11-14 PROCEDURE — 45385 COLONOSCOPY W/LESION REMOVAL: CPT | Performed by: COLON & RECTAL SURGERY

## 2024-11-14 PROCEDURE — 2500000004 HC RX 250 GENERAL PHARMACY W/ HCPCS (ALT 636 FOR OP/ED): Performed by: COLON & RECTAL SURGERY

## 2024-11-14 PROCEDURE — 7100000009 HC PHASE TWO TIME - INITIAL BASE CHARGE

## 2024-11-14 RX ORDER — MIDAZOLAM HYDROCHLORIDE 1 MG/ML
INJECTION, SOLUTION INTRAMUSCULAR; INTRAVENOUS AS NEEDED
Status: COMPLETED | OUTPATIENT
Start: 2024-11-14 | End: 2024-11-14

## 2024-11-14 RX ORDER — FENTANYL CITRATE 50 UG/ML
INJECTION, SOLUTION INTRAMUSCULAR; INTRAVENOUS AS NEEDED
Status: COMPLETED | OUTPATIENT
Start: 2024-11-14 | End: 2024-11-14

## 2024-11-14 ASSESSMENT — PAIN - FUNCTIONAL ASSESSMENT
PAIN_FUNCTIONAL_ASSESSMENT: 0-10

## 2024-11-14 ASSESSMENT — PAIN SCALES - GENERAL
PAINLEVEL_OUTOF10: 0 - NO PAIN
PAINLEVEL_OUTOF10: 5 - MODERATE PAIN
PAINLEVEL_OUTOF10: 0 - NO PAIN
PAINLEVEL_OUTOF10: 5 - MODERATE PAIN

## 2024-11-14 ASSESSMENT — COLUMBIA-SUICIDE SEVERITY RATING SCALE - C-SSRS
6. HAVE YOU EVER DONE ANYTHING, STARTED TO DO ANYTHING, OR PREPARED TO DO ANYTHING TO END YOUR LIFE?: NO
1. IN THE PAST MONTH, HAVE YOU WISHED YOU WERE DEAD OR WISHED YOU COULD GO TO SLEEP AND NOT WAKE UP?: NO
2. HAVE YOU ACTUALLY HAD ANY THOUGHTS OF KILLING YOURSELF?: NO

## 2024-11-14 NOTE — LETTER
[phone]        Dear Ms. Majano,       It was my pleasure to see you again at your recent colonoscopy.  At that time,  you were noted to have 13 polyps in the colon.  The polyps were completely excised and pathology returned the hyperplastic and adenomatous type.  Which are a precancerous type of polyp if not removed, but yours were completely resected.  The current recommendation is to repeat the colonoscopy in 2 years.       Thank you very much for allowing me to take part in your care, please feel free to contact me with any questions or concerns at [phone].          Sincerely,       Vania Daniel M.D. JOHAN, FASCRS    CC:  Primary Care:

## 2024-11-14 NOTE — DISCHARGE INSTRUCTIONS
Patient Instructions after a Colonoscopy      The anesthetics, sedatives or narcotics which were given to you today will be acting in your body for the next 24 hours, so you might feel a little sleepy or groggy.  This feeling should slowly wear off. Carefully read and follow the instructions.     You received sedation today:  - Do not drive or operate any machinery or power tools of any kind.   - No alcoholic beverages today, not even beer or wine.  - Do not make any important decisions or sign any legal documents.  - No over the counter medications that contain alcohol or that may cause drowsiness.  - Do not make any important decisions or sign any legal documents.  - Make sure you have someone with you for first 24 hours.    While it is common to experience mild to moderate abdominal distention, gas, or belching after your procedure, if any of these symptoms occur following discharge from the GI Lab or within one week of having your procedure, call the [facility] to be advised whether a visit to your nearest Urgent Care or Emergency Department is indicated.  Take this paper with you if you go.     - If you develop an allergic reaction to the medications that were given during your procedure such as difficulty breathing, rash, hives, severe nausea, vomiting or lightheadedness.  - If you experience chest pain, shortness of breath, severe abdominal pain, fevers and chills.  -If you develop signs and symptoms of bleeding such as blood in your spit, if your stools turn black, tarry, or bloody  - If you have not urinated within 8 hours following your procedure.  - If your IV site becomes painful, red, inflamed, or looks infected.    If you received a biopsy/polypectomy/sphincterotomy the following instructions apply below:    __ Do not use Aspirin containing products, non-steroidal medications or anti-coagulants for one week following your procedure. (Examples of these types of medications are: Advil,  Arthrotec, Aleve, Coumadin, Ecotrin, Heparin, Ibuprofen, Indocin, Motrin, Naprosyn, Nuprin, Plavix, Vioxx, and Voltarin, or their generic forms.  This list is not all-inclusive.  Check with your physician or pharmacist before resuming medications.)   __ Eat a soft diet today.  Avoid foods that are poorly digested for the next 24 hours.  These foods would include: nuts, beans, lettuce, red meats, and fried foods. Start with liquids and advance your diet as tolerated, gradually work up to eating solids.   __ Do not have a Barium Study or Enema for one week.    Your physician recommends the additional following instructions:    -You have a contact number available for emergencies. The signs and symptoms of potential delayed complications were discussed with you. You may return to normal activities tomorrow.  -Resume your previous diet.  -Continue your present medications.   -We are waiting for your pathology results.  -Your physician has recommended a repeat colonoscopy (date to be determined after pending pathology results are reviewed) for surveillance based on pathology results.  -The findings and recommendations have been discussed with you.  -The findings and recommendations were discussed with your family.  - Please see Medication Reconciliation Form for new medication/medications prescribed.       If you experience any problems or have any questions following discharge from the GI Lab, please call:        Nurse Signature                                                                        Date___________________                                                                            Patient/Responsible Party Signature                                        Date___________________

## 2024-11-14 NOTE — H&P
History Of Present Illness  Leah Majano is a 34 y.o. female presenting with Hx of multiple large TA's     Past Medical History  Past Medical History:   Diagnosis Date    Anxiety     Colon polyps     Hemorrhoids     MYH-associated polyposis (MAP)      Surgical History  Past Surgical History:   Procedure Laterality Date    BASAL CELL CARCINOMA EXCISION Left     rib cage    COLONOSCOPY  03/2023    x2    DENTAL SURGERY       Social History  She reports that she has never smoked. She has never used smokeless tobacco. She reports current alcohol use of about 6.0 standard drinks of alcohol per week. She reports that she does not use drugs.    Family History  Family History   Problem Relation Name Age of Onset    Other (CARDIAC DISORDER) Father Bipin     Other (CEREBROVASCULAR ACCIDENT) Father Bipin     Heart disease Father Bipin     Stroke Father Bipin     Cancer Mother Adela     Kidney disease Mother Adela         Allergies  No Known Allergies  Review of Systems  Pre-sedation Evaluation:  ASA Classification - ASA 1 - Normal health patient  Mallampati Score - I (soft palate, uvula, fauces, and tonsillar pillars visible)    Physical Exam   Constitutional: Well developed, awake/alert/oriented x3, no distress, alert and cooperative   CV:  RRR  Lungs:  No audible wheezing, no tachypnea, chest symmetric, no increased WOB  Gastrointestinal: soft,  nontender  Neurological: alert and oriented     Last Recorded Vitals  There were no vitals taken for this visit.     Assessment/Plan   colonoscopy  PTA/Current Medications:  (Not in a hospital admission)    Current Outpatient Medications   Medication Sig Dispense Refill    cetirizine (ZyrTEC) 10 mg tablet Take 1 tablet (10 mg) by mouth once daily.      cyanocobalamin/thiamine HCl (VITAMIN B12-VITAMIN B1 ORAL) Take by mouth.      polyethylene glycol (GaviLyte-G) 236-22.74-6.74 -5.86 gram solution Please refer to the printed instructions that were mailed to you. 4000 mL 0     TURMERIC ORAL Take by mouth.       No current facility-administered medications for this encounter.     Vania Daniel MD

## 2024-11-21 LAB
LABORATORY COMMENT REPORT: NORMAL
PATH REPORT.FINAL DX SPEC: NORMAL
PATH REPORT.GROSS SPEC: NORMAL
PATH REPORT.TOTAL CANCER: NORMAL

## 2025-02-06 ENCOUNTER — HOSPITAL ENCOUNTER (OUTPATIENT)
Dept: RADIOLOGY | Facility: HOSPITAL | Age: 35
Discharge: HOME | End: 2025-02-06
Payer: COMMERCIAL

## 2025-02-06 DIAGNOSIS — R92.8 ABNORMALITY OF LEFT BREAST ON SCREENING MAMMOGRAM: ICD-10-CM

## 2025-02-06 PROCEDURE — 77065 DX MAMMO INCL CAD UNI: CPT | Mod: LT

## 2025-02-12 ENCOUNTER — APPOINTMENT (OUTPATIENT)
Dept: SURGERY | Facility: CLINIC | Age: 35
End: 2025-02-12
Payer: COMMERCIAL

## 2025-02-12 VITALS
HEART RATE: 87 BPM | HEIGHT: 67 IN | BODY MASS INDEX: 26.84 KG/M2 | DIASTOLIC BLOOD PRESSURE: 80 MMHG | SYSTOLIC BLOOD PRESSURE: 124 MMHG | OXYGEN SATURATION: 99 % | WEIGHT: 171 LBS

## 2025-02-12 DIAGNOSIS — Z13.71 BRCA NEGATIVE: ICD-10-CM

## 2025-02-12 DIAGNOSIS — N63.42 SUBAREOLAR MASS OF LEFT BREAST: Primary | ICD-10-CM

## 2025-02-12 DIAGNOSIS — Z12.31 ENCOUNTER FOR SCREENING MAMMOGRAM FOR BREAST CANCER: ICD-10-CM

## 2025-02-12 PROCEDURE — 3008F BODY MASS INDEX DOCD: CPT | Performed by: SURGERY

## 2025-02-12 PROCEDURE — 1036F TOBACCO NON-USER: CPT | Performed by: SURGERY

## 2025-02-12 PROCEDURE — 99213 OFFICE O/P EST LOW 20 MIN: CPT | Performed by: SURGERY

## 2025-02-12 RX ORDER — ESCITALOPRAM OXALATE 10 MG/1
10 TABLET ORAL DAILY
COMMUNITY

## 2025-02-12 ASSESSMENT — ENCOUNTER SYMPTOMS
BRUISES/BLEEDS EASILY: 0
CONSTIPATION: 0
ARTHRALGIAS: 0
COUGH: 0
FATIGUE: 0
MYALGIAS: 0
DIARRHEA: 0
SHORTNESS OF BREATH: 0
CONFUSION: 0
WOUND: 0
DYSURIA: 0
VOMITING: 0
FLANK PAIN: 0
CHILLS: 0
EYE PAIN: 0
FREQUENCY: 0
EYE REDNESS: 0
HEMATURIA: 0
SPEECH DIFFICULTY: 0
ABDOMINAL PAIN: 0
AGITATION: 0
NAUSEA: 0
WEAKNESS: 0
HEADACHES: 0
POLYPHAGIA: 0
FEVER: 0

## 2025-02-12 NOTE — PROGRESS NOTES
GENERAL SURGERY OFFICE NOTE    Patient: Leah Majano    Age: 34 y.o.   Gender: female    MRN: [id number]    PCP: Mary Jo Jenkins, DO        SUBJECTIVE     Chief Complaint  Follow-up (Patient is here for a 6 month left breast follow up. Patient states that she is not having any problems with either breast.)       ANKUSH Lynn returns to the office for a 6-month follow-up of a left breast asymmetry.  She has not had any breast issues in the last 6 months.  She does occasionally get some sharp pains which are short-lived and self resolving which may be related to hormonal fluctuations.  She never has to take pain medication for this.  She has not noticed any new palpable lesions, skin changes or nipple discharge.  She recently underwent a left diagnostic mammogram which shows that the 8 mm left breast asymmetry is unchanged from 6 months ago.  Ultrasound was not performed as the asymmetry could not be seen by ultrasound previously.  She again notes that she is plugged into a colorectal surgeon given her genetic mutation.    Risk factors for breast cancer: 34-year-old white female; menarche at age [age]; first live birth at age 29; no previous breast biopsy; no first-degree relative with breast cancer.  She is premenopausal.  Her mother had kidney cancer.  She has a maternal cousin once removed who had breast cancer in her 60s.  Maternal uncle had testicular cancer at age 37.  Maternal grandfather and maternal uncle both had prostate cancer.  This gives her a 5-year Carlotta score of 0.4% and a lifetime risk of 13.9% which puts her in an above average risk category.    ROS  Review of Systems   Constitutional:  Negative for chills, fatigue and fever.   HENT:  Negative for congestion, ear pain and hearing loss.    Eyes:  Negative for pain and redness.   Respiratory:  Negative for cough and shortness of breath.    Cardiovascular:  Negative for chest pain and leg swelling.   Gastrointestinal:  Negative for abdominal  "pain, constipation, diarrhea, nausea and vomiting.   Endocrine: Negative for polyphagia.   Genitourinary:  Negative for dysuria, flank pain, frequency and hematuria.   Musculoskeletal:  Negative for arthralgias and myalgias.   Skin:  Negative for rash and wound.   Allergic/Immunologic: Negative for immunocompromised state.   Neurological:  Negative for speech difficulty, weakness and headaches.   Hematological:  Does not bruise/bleed easily.   Psychiatric/Behavioral:  Negative for agitation and confusion.           HISTORY     Past Medical History:   Diagnosis Date    Anxiety     Colon polyps     Hemorrhoids     MYH-associated polyposis (MAP)     Tachycardia         Past Surgical History:   Procedure Laterality Date    BASAL CELL CARCINOMA EXCISION Left     rib cage    COLONOSCOPY  03/2023    x2    COLONOSCOPY  11/14/2024    DENTAL SURGERY      POLYPECTOMY          Family History   Problem Relation Name Age of Onset    Other (CARDIAC DISORDER) Father Bipin     Other (CEREBROVASCULAR ACCIDENT) Father Bipin     Heart disease Father Bipin     Stroke Father Bipin     Cancer Mother Adela     Kidney disease Mother Adela         No Known Allergies     Social History     Tobacco Use   Smoking Status Never   Smokeless Tobacco Never        Social History     Substance and Sexual Activity   Alcohol Use Yes    Alcohol/week: 6.0 standard drinks of alcohol    Types: 2 Glasses of wine, 2 Cans of beer, 2 Shots of liquor per week        HOME MEDICATIONS  Current Outpatient Medications   Medication Instructions    cetirizine (ZyrTEC) 10 mg tablet 1 tablet, Daily    cyanocobalamin/thiamine HCl (VITAMIN B12-VITAMIN B1 ORAL) Take by mouth.    escitalopram (LEXAPRO) 10 mg, oral, Daily    TURMERIC ORAL Take by mouth.          OBJECTIVE   Last Recorded Vitals.  Blood pressure 124/80, pulse 87, height 1.702 m (5' 7\"), weight 77.6 kg (171 lb), SpO2 99%.     PHYSICAL EXAM  Physical Exam   Phone visit/previous exam:  General: Well-developed, " well-nourished and in no acute distress.  Head: Normocephalic. Atraumatic.  Neck/thyroid: Neck is supple.   Eyes: Pupils equal round and reactive to light. Conjunctiva normal.  ENMT: No masses or deformity of external nose. External ears without masses.  Respiratory/Chest:  Normal respiratory effort.  Breast: Small breast, symmetrical.  Dense tissue throughout both breast without predominant mass.  No expressible nipple discharge on today's exam.  Lymphatics: No palpable lymphadenopathy of the cervical, supraclavicular or axillary regions.  Cardiovascular: Regular rate and rhythm.   Abdomen: Soft, nontender, nondistended.   Musculoskeletal: Joints and limbs are grossly normal. Normal gait. Normal range of motion of major joints.  Neuro: Oriented to person, place and time. No obvious neurological deficit. Motor strength grossly normal.  Psych: Normal mood and affect.    RESULTS   Labs  Scanned document of genetic testing results dated 5/4/2023 were reviewed.    Radiology Resutls  mammo left diagnostic tomosynthesis  Status: Final result     PACS Images     Show images for BI mammo left diagnostic tomosynthesis  Signed by    Signed Time Phone Pager   Jeremy Villarreal MD 2/06/2025 10:32 [phone]     Exam Information    Status Exam Begun Exam Ended   Final 2/06/2025 09:57 2/06/2025 10:14     Study Result    Narrative & Impression   Interpreted By:  Jeremy Villarreal,   STUDY:  BI MAMMO LEFT DIAGNOSTIC TOMOSYNTHESIS;  2/6/2025 10:14 am      ACCESSION NUMBER(S):  [id number]      ORDERING CLINICIAN:  GEMINI TAYLOR      INDICATION:  Asymmetry posterior depth left breast. No prior sonographic correlate.      ,R92.8 Other abnormal and inconclusive findings on diagnostic imaging  of breast      COMPARISON:  Multiple prior examinations dating back to 07/28/2023      FINDINGS:  MAMMOGRAPHY: 2D and tomosynthesis images were reviewed at 1 mm slice  thickness.      Density:  The breasts are heterogeneously dense,  which may obscure  small masses.      8 mm asymmetry of the posterior depth left breast seen best on CC  projection medial to the posterior nipple line is stable. Continued  surveillance is recommended beginning date 07/31/2024.      Past ultrasound examination of 08/06/2024 did not demonstrate a  sonographic correlate, negating the need for a repeat ultrasound.          IMPRESSION:  Stable asymmetry left breast.      BI-RADS CATEGORY:  BI-RADS Category:  3 Probably Benign.  Recommendation:  Short-term Interval Follow-up Imaging.  Recommended Date:  6 Months.  Laterality:  Left.         ASSESSMENT / PLAN   Diagnoses and all orders for this visit:  Subareolar mass of left breast  BRCA negative  Encounter for screening mammogram for breast cancer  -     BI mammo bilateral screening tomosynthesis; Future          Plan  July 2024: LEFT; 8 mm asymmetry     1.  The patient that although she has some above average risk factors for breast cancer, her lifetime Carlotta score is not greater than 20%.  Therefore, she does not qualify for high risk screening based off of this evaluation.  She actually did have a full panel genetic testing which did include the BRCA gene and she is BRCA negative.  2.  She had undergone a screening mammogram which showed an architectural distortion/asymmetry of the left breast.  Diagnostic mammogram showed resolution of part of this asymmetry; therefore, it was felt to be benign.  However, on her 6-month follow-up, this 8 mm asymmetry was again identified.  Ultrasound was not performed.  Will continue to monitor this every 6 months for 2 years to demonstrate stability.  Will schedule for bilateral screening mammogram in 6 months with follow-up in the office after the mammogram.  3.  Since she does have higher than average risk for developing breast cancer, have encouraged her to continue doing her yearly screening mammograms once her 2-year surveillance is completed.  4.  Reviewed her MUTYH mutation  with genetics.  Response: Sounds like she is a carrier for MUTYH-associated polyposis (MAP)--a very common finding. MAP is a recessive condition and therefore requires two mutations to have affect. Not worried at all about breast cancer risk for MUTYH carriers.  She has already had several colonoscopy procedures.  She does follow-up with Dr. Daniel (colorectal surgery) for this mutation.        Sushila Bueno MD, FACS  [facility] General Surgery  [address] [place] [address];   [address]  [place]  [address] [phone]

## 2025-02-12 NOTE — PATIENT INSTRUCTIONS
1.  The asymmetry in your left breast is unchanged over the last 6 months.  You will be due for mammogram of both breast in 6 months.  Follow-up in Dr. Bueno's office after this mammogram.  2.  Your MUTYH mutation was reviewed with a .  They did not feel that you required more frequent screening compared to the average population for breast cancer.  Therefore, we will continue with your yearly screening mammograms.  However, you should be following up with GI regarding your increased risk of colonic polyps.  3.  Continue with your monthly self breast exams.  If you identify any abnormalities, please call Dr. Bueno's office immediately.  [phone]

## 2025-02-25 NOTE — PROGRESS NOTES
Subjective   Patient ID: Leah Majano is a 34 y.o. female who presents for Annual Exam.  She presents for annual exam. Pap returned with ASCUS and no evidence of HPV on 2/8/2024. Plan is for repeat pap with HPV in 2027.  She was last seen 2/9/2022 at postpartum visit after vaginal delivery.  She has screened negative for BRCA. She has autosomal recessive MUTYH-associated polyposis that increases her risk of colon cancer. Data is unclear if this also increases risks of other cancers, and age appropriate screening is recommended for gyn at her genetics consult.  She has used OCP for greater than 5 years in the past. Vasectomy is her current contraceptive.   She is followed for increased risk of breast cancer by Dr. Bueno.     She notes an intermittent facial swelling, redness and dry rash. This seems to last 24 hours, is more common in summer, can occur after exercise, has occurred at start of menses but last time was at end of menses. She does not think this is an allergy since benadryl does not help, but she does get similar swelling after exposure to cats. We discussed that this is unlikely to be caused by hormone fluctuations but we would have the option of trying daily progestin only contraceptive if desired. She was encouraged to follow with PCP for evaluation.  Menses are monthly.            Review of Systems   Constitutional:  Negative for activity change.   HENT:  Negative for congestion.    Respiratory:  Negative for apnea and cough.    Cardiovascular:  Negative for chest pain.   Gastrointestinal:  Negative for constipation and diarrhea.   Genitourinary:  Negative for hematuria and vaginal pain.   Musculoskeletal:  Negative for joint swelling.   Neurological:  Negative for dizziness.   Psychiatric/Behavioral:  Negative for agitation.        Past Medical History:   Diagnosis Date    Anxiety     Colon polyps     Hemorrhoids     MYH-associated polyposis (MAP)     Tachycardia       Past Surgical  History:   Procedure Laterality Date    BASAL CELL CARCINOMA EXCISION Left     rib cage    COLONOSCOPY  03/2023    x2    COLONOSCOPY  11/14/2024    DENTAL SURGERY      POLYPECTOMY        No Known Allergies   Current Outpatient Medications on File Prior to Visit   Medication Sig Dispense Refill    cetirizine (ZyrTEC) 10 mg tablet Take 1 tablet (10 mg) by mouth once daily.      cyanocobalamin/thiamine HCl (VITAMIN B12-VITAMIN B1 ORAL) Take by mouth.      escitalopram (Lexapro) 10 mg tablet Take 1 tablet (10 mg) by mouth once daily.      TURMERIC ORAL Take by mouth.      psyllium (Metamucil) 0.4 gram capsule Take 5 capsules by mouth 4 times a day.       No current facility-administered medications on file prior to visit.        Objective   Physical Exam  Constitutional:       Appearance: Normal appearance.   Neck:      Thyroid: No thyromegaly.   Cardiovascular:      Rate and Rhythm: Normal rate and regular rhythm.      Heart sounds: Normal heart sounds.   Pulmonary:      Effort: Pulmonary effort is normal.      Breath sounds: Normal breath sounds.   Chest:      Chest wall: No mass.   Breasts:     Right: Normal. No inverted nipple, mass, nipple discharge or skin change.      Left: Normal. No inverted nipple, mass, nipple discharge or skin change.   Abdominal:      General: There is no distension.      Palpations: Abdomen is soft. There is no mass.      Tenderness: There is no abdominal tenderness.   Genitourinary:     General: Normal vulva.      Exam position: Lithotomy position.      Labia:         Right: No rash.         Left: No rash.       Urethra: No urethral lesion.      Vagina: Normal. No lesions.      Cervix: No friability or lesion.      Uterus: Normal. Not enlarged and not tender.       Adnexa: Right adnexa normal and left adnexa normal.        Right: No mass or tenderness.          Left: No mass or tenderness.     Musculoskeletal:         General: No deformity.      Cervical back: Neck supple.    Lymphadenopathy:      Cervical: No cervical adenopathy.   Skin:     General: Skin is warm and dry.      Findings: No rash.   Neurological:      General: No focal deficit present.      Mental Status: She is alert.   Psychiatric:         Mood and Affect: Mood normal.         Behavior: Behavior is cooperative.         Thought Content: Thought content normal.           Problem List Items Addressed This Visit       Well woman exam with routine gynecological exam - Primary    Overview     2/8/2024 pap returned with ASCUS, negative for HPV. Plan pap again in 2027.  She has screened negative for BRCA. She has autosomal recessive MUTYH-associated polyposis that increases her risk of colon cancer. Data is unclear if this also increases risks of other cancers, and age appropriate screening is recommended for gyn.         Current Assessment & Plan     Pap is not yet indicated.  Regular exercise and attaining/maintaining a healthy weight is encouraged.   Adequate calcium intake with diet or supplements is encouraged.    We will notify of any abnormal results.

## 2025-02-27 ENCOUNTER — APPOINTMENT (OUTPATIENT)
Dept: OBSTETRICS AND GYNECOLOGY | Facility: CLINIC | Age: 35
End: 2025-02-27
Payer: COMMERCIAL

## 2025-02-27 VITALS
BODY MASS INDEX: 26.84 KG/M2 | WEIGHT: 171 LBS | DIASTOLIC BLOOD PRESSURE: 82 MMHG | SYSTOLIC BLOOD PRESSURE: 118 MMHG | HEIGHT: 67 IN

## 2025-02-27 DIAGNOSIS — Z01.419 WELL WOMAN EXAM WITH ROUTINE GYNECOLOGICAL EXAM: Primary | ICD-10-CM

## 2025-02-27 PROCEDURE — 3008F BODY MASS INDEX DOCD: CPT | Performed by: OBSTETRICS & GYNECOLOGY

## 2025-02-27 PROCEDURE — 1036F TOBACCO NON-USER: CPT | Performed by: OBSTETRICS & GYNECOLOGY

## 2025-02-27 PROCEDURE — 99395 PREV VISIT EST AGE 18-39: CPT | Performed by: OBSTETRICS & GYNECOLOGY

## 2025-02-27 RX ORDER — PSYLLIUM HUSK 0.4 G
5 CAPSULE ORAL 4 TIMES DAILY
COMMUNITY

## 2025-02-27 ASSESSMENT — ENCOUNTER SYMPTOMS
JOINT SWELLING: 0
DIARRHEA: 0
DIZZINESS: 0
ACTIVITY CHANGE: 0
COUGH: 0
CONSTIPATION: 0
APNEA: 0
HEMATURIA: 0
AGITATION: 0

## 2025-06-02 ENCOUNTER — APPOINTMENT (OUTPATIENT)
Dept: PRIMARY CARE | Facility: CLINIC | Age: 35
End: 2025-06-02
Payer: COMMERCIAL

## 2025-08-13 ENCOUNTER — HOSPITAL ENCOUNTER (OUTPATIENT)
Dept: RADIOLOGY | Facility: HOSPITAL | Age: 35
Discharge: HOME | End: 2025-08-13
Payer: COMMERCIAL

## 2025-08-13 VITALS — BODY MASS INDEX: 26.84 KG/M2 | WEIGHT: 171 LBS | HEIGHT: 67 IN

## 2025-08-13 DIAGNOSIS — Z12.31 ENCOUNTER FOR SCREENING MAMMOGRAM FOR BREAST CANCER: ICD-10-CM

## 2025-08-13 PROCEDURE — 77067 SCR MAMMO BI INCL CAD: CPT

## 2025-08-13 PROCEDURE — 77063 BREAST TOMOSYNTHESIS BI: CPT

## 2025-08-20 ENCOUNTER — APPOINTMENT (OUTPATIENT)
Dept: SURGERY | Facility: CLINIC | Age: 35
End: 2025-08-20
Payer: COMMERCIAL

## 2025-08-20 VITALS
BODY MASS INDEX: 27.25 KG/M2 | WEIGHT: 173.6 LBS | DIASTOLIC BLOOD PRESSURE: 74 MMHG | SYSTOLIC BLOOD PRESSURE: 105 MMHG | HEART RATE: 68 BPM | HEIGHT: 67 IN | OXYGEN SATURATION: 98 %

## 2025-08-20 DIAGNOSIS — Z13.71 BRCA NEGATIVE: ICD-10-CM

## 2025-08-20 DIAGNOSIS — R92.30 DENSE BREAST TISSUE: ICD-10-CM

## 2025-08-20 DIAGNOSIS — N63.42 SUBAREOLAR MASS OF LEFT BREAST: Primary | ICD-10-CM

## 2025-08-20 DIAGNOSIS — Z12.31 ENCOUNTER FOR SCREENING MAMMOGRAM FOR BREAST CANCER: ICD-10-CM

## 2025-08-20 PROCEDURE — 3008F BODY MASS INDEX DOCD: CPT | Performed by: SURGERY

## 2025-08-20 PROCEDURE — 99213 OFFICE O/P EST LOW 20 MIN: CPT | Performed by: SURGERY

## 2025-08-20 PROCEDURE — 1036F TOBACCO NON-USER: CPT | Performed by: SURGERY

## 2025-08-20 ASSESSMENT — ENCOUNTER SYMPTOMS
DIARRHEA: 0
ABDOMINAL PAIN: 0
FATIGUE: 0
FEVER: 0
CONFUSION: 0
SHORTNESS OF BREATH: 0
EYE REDNESS: 0
BRUISES/BLEEDS EASILY: 0
AGITATION: 0
NAUSEA: 0
COUGH: 0
VOMITING: 0
CONSTIPATION: 0
CHILLS: 0
DYSURIA: 0
WOUND: 0
FLANK PAIN: 0
POLYPHAGIA: 0
HEADACHES: 0
HEMATURIA: 0
SPEECH DIFFICULTY: 0
ARTHRALGIAS: 0
EYE PAIN: 0
WEAKNESS: 0
FREQUENCY: 0
MYALGIAS: 0

## 2025-08-21 PROBLEM — Z00.00 PREVENTATIVE HEALTH CARE: Status: ACTIVE | Noted: 2025-08-21

## 2025-08-22 ENCOUNTER — APPOINTMENT (OUTPATIENT)
Dept: PRIMARY CARE | Facility: CLINIC | Age: 35
End: 2025-08-22
Payer: COMMERCIAL

## 2025-08-22 VITALS
HEIGHT: 68 IN | SYSTOLIC BLOOD PRESSURE: 115 MMHG | BODY MASS INDEX: 26.37 KG/M2 | WEIGHT: 174 LBS | HEART RATE: 85 BPM | DIASTOLIC BLOOD PRESSURE: 77 MMHG | OXYGEN SATURATION: 97 %

## 2025-08-22 DIAGNOSIS — Z13.228 SCREENING FOR ENDOCRINE, METABOLIC AND IMMUNITY DISORDER: ICD-10-CM

## 2025-08-22 DIAGNOSIS — Z11.59 NEED FOR HEPATITIS C SCREENING TEST: ICD-10-CM

## 2025-08-22 DIAGNOSIS — Z13.0 SCREENING FOR ENDOCRINE, METABOLIC AND IMMUNITY DISORDER: ICD-10-CM

## 2025-08-22 DIAGNOSIS — F32.1 CURRENT MODERATE EPISODE OF MAJOR DEPRESSIVE DISORDER WITHOUT PRIOR EPISODE (MULTI): ICD-10-CM

## 2025-08-22 DIAGNOSIS — F41.1 GAD (GENERALIZED ANXIETY DISORDER): ICD-10-CM

## 2025-08-22 DIAGNOSIS — Z13.220 ENCOUNTER FOR SCREENING FOR LIPID DISORDER: ICD-10-CM

## 2025-08-22 DIAGNOSIS — D12.6 MYH-ASSOCIATED COLONIC POLYPOSIS (MAP): ICD-10-CM

## 2025-08-22 DIAGNOSIS — Z13.29 SCREENING FOR ENDOCRINE, METABOLIC AND IMMUNITY DISORDER: ICD-10-CM

## 2025-08-22 DIAGNOSIS — Z00.00 PREVENTATIVE HEALTH CARE: Primary | ICD-10-CM

## 2025-08-22 PROCEDURE — 3008F BODY MASS INDEX DOCD: CPT | Performed by: STUDENT IN AN ORGANIZED HEALTH CARE EDUCATION/TRAINING PROGRAM

## 2025-08-22 PROCEDURE — 1036F TOBACCO NON-USER: CPT | Performed by: STUDENT IN AN ORGANIZED HEALTH CARE EDUCATION/TRAINING PROGRAM

## 2025-08-22 PROCEDURE — 99214 OFFICE O/P EST MOD 30 MIN: CPT | Performed by: STUDENT IN AN ORGANIZED HEALTH CARE EDUCATION/TRAINING PROGRAM

## 2025-08-22 RX ORDER — BUPROPION HYDROCHLORIDE 150 MG/1
150 TABLET, EXTENDED RELEASE ORAL DAILY
COMMUNITY

## 2025-08-22 ASSESSMENT — COLUMBIA-SUICIDE SEVERITY RATING SCALE - C-SSRS
1. IN THE PAST MONTH, HAVE YOU WISHED YOU WERE DEAD OR WISHED YOU COULD GO TO SLEEP AND NOT WAKE UP?: NO
2. HAVE YOU ACTUALLY HAD ANY THOUGHTS OF KILLING YOURSELF?: NO
6. HAVE YOU EVER DONE ANYTHING, STARTED TO DO ANYTHING, OR PREPARED TO DO ANYTHING TO END YOUR LIFE?: NO

## 2025-08-22 ASSESSMENT — ANXIETY QUESTIONNAIRES
GAD7 TOTAL SCORE: 3
1. FEELING NERVOUS, ANXIOUS, OR ON EDGE: NOT AT ALL
7. FEELING AFRAID AS IF SOMETHING AWFUL MIGHT HAPPEN: SEVERAL DAYS
4. TROUBLE RELAXING: NOT AT ALL
IF YOU CHECKED OFF ANY PROBLEMS ON THIS QUESTIONNAIRE, HOW DIFFICULT HAVE THESE PROBLEMS MADE IT FOR YOU TO DO YOUR WORK, TAKE CARE OF THINGS AT HOME, OR GET ALONG WITH OTHER PEOPLE: NOT DIFFICULT AT ALL
6. BECOMING EASILY ANNOYED OR IRRITABLE: SEVERAL DAYS
5. BEING SO RESTLESS THAT IT IS HARD TO SIT STILL: SEVERAL DAYS
2. NOT BEING ABLE TO STOP OR CONTROL WORRYING: NOT AT ALL
3. WORRYING TOO MUCH ABOUT DIFFERENT THINGS: NOT AT ALL

## 2025-08-22 ASSESSMENT — PATIENT HEALTH QUESTIONNAIRE - PHQ9
9. THOUGHTS THAT YOU WOULD BE BETTER OFF DEAD, OR OF HURTING YOURSELF: NOT AT ALL
SUM OF ALL RESPONSES TO PHQ9 QUESTIONS 1 AND 2: 6
3. TROUBLE FALLING OR STAYING ASLEEP OR SLEEPING TOO MUCH: NEARLY EVERY DAY
SUM OF ALL RESPONSES TO PHQ QUESTIONS 1-9: 16
4. FEELING TIRED OR HAVING LITTLE ENERGY: NEARLY EVERY DAY
7. TROUBLE CONCENTRATING ON THINGS, SUCH AS READING THE NEWSPAPER OR WATCHING TELEVISION: NEARLY EVERY DAY
6. FEELING BAD ABOUT YOURSELF - OR THAT YOU ARE A FAILURE OR HAVE LET YOURSELF OR YOUR FAMILY DOWN: SEVERAL DAYS
1. LITTLE INTEREST OR PLEASURE IN DOING THINGS: NEARLY EVERY DAY
2. FEELING DOWN, DEPRESSED OR HOPELESS: NEARLY EVERY DAY
8. MOVING OR SPEAKING SO SLOWLY THAT OTHER PEOPLE COULD HAVE NOTICED. OR THE OPPOSITE, BEING SO FIGETY OR RESTLESS THAT YOU HAVE BEEN MOVING AROUND A LOT MORE THAN USUAL: NOT AT ALL
5. POOR APPETITE OR OVEREATING: NOT AT ALL

## 2025-08-22 ASSESSMENT — ENCOUNTER SYMPTOMS
COUGH: 0
OCCASIONAL FEELINGS OF UNSTEADINESS: 0
LOSS OF SENSATION IN FEET: 0
SHORTNESS OF BREATH: 0
CONFUSION: 0
NERVOUS/ANXIOUS: 1
WHEEZING: 0
DYSPHORIC MOOD: 1
FATIGUE: 1
DEPRESSION: 1

## 2025-08-23 LAB
ALBUMIN SERPL-MCNC: 4.3 G/DL (ref 3.6–5.1)
ALP SERPL-CCNC: 43 U/L (ref 31–125)
ALT SERPL-CCNC: 8 U/L (ref 6–29)
ANION GAP SERPL CALCULATED.4IONS-SCNC: 7 MMOL/L (CALC) (ref 7–17)
AST SERPL-CCNC: 12 U/L (ref 10–30)
BASOPHILS # BLD AUTO: 32 CELLS/UL (ref 0–200)
BASOPHILS NFR BLD AUTO: 0.5 %
BILIRUB SERPL-MCNC: 0.6 MG/DL (ref 0.2–1.2)
BUN SERPL-MCNC: 10 MG/DL (ref 7–25)
CALCIUM SERPL-MCNC: 8.7 MG/DL (ref 8.6–10.2)
CHLORIDE SERPL-SCNC: 105 MMOL/L (ref 98–110)
CHOLEST SERPL-MCNC: 184 MG/DL
CHOLEST/HDLC SERPL: 2.4 (CALC)
CO2 SERPL-SCNC: 26 MMOL/L (ref 20–32)
CREAT SERPL-MCNC: 0.7 MG/DL (ref 0.5–0.97)
EGFRCR SERPLBLD CKD-EPI 2021: 116 ML/MIN/1.73M2
EOSINOPHIL # BLD AUTO: 88 CELLS/UL (ref 15–500)
EOSINOPHIL NFR BLD AUTO: 1.4 %
ERYTHROCYTE [DISTWIDTH] IN BLOOD BY AUTOMATED COUNT: 13.5 % (ref 11–15)
EST. AVERAGE GLUCOSE BLD GHB EST-MCNC: 108 MG/DL
EST. AVERAGE GLUCOSE BLD GHB EST-SCNC: 6 MMOL/L
GLUCOSE SERPL-MCNC: 80 MG/DL (ref 65–99)
HBA1C MFR BLD: 5.4 %
HCT VFR BLD AUTO: 39.9 % (ref 35–45)
HCV AB SERPL QL IA: NORMAL
HDLC SERPL-MCNC: 77 MG/DL
HGB BLD-MCNC: 12.6 G/DL (ref 11.7–15.5)
LDLC SERPL CALC-MCNC: 94 MG/DL (CALC)
LYMPHOCYTES # BLD AUTO: 2249 CELLS/UL (ref 850–3900)
LYMPHOCYTES NFR BLD AUTO: 35.7 %
MCH RBC QN AUTO: 29.3 PG (ref 27–33)
MCHC RBC AUTO-ENTMCNC: 31.6 G/DL (ref 32–36)
MCV RBC AUTO: 92.8 FL (ref 80–100)
MONOCYTES # BLD AUTO: 454 CELLS/UL (ref 200–950)
MONOCYTES NFR BLD AUTO: 7.2 %
NEUTROPHILS # BLD AUTO: 3478 CELLS/UL (ref 1500–7800)
NEUTROPHILS NFR BLD AUTO: 55.2 %
NONHDLC SERPL-MCNC: 107 MG/DL (CALC)
PLATELET # BLD AUTO: 199 THOUSAND/UL (ref 140–400)
PMV BLD REES-ECKER: 11.4 FL (ref 7.5–12.5)
POTASSIUM SERPL-SCNC: 4 MMOL/L (ref 3.5–5.3)
PROT SERPL-MCNC: 6.9 G/DL (ref 6.1–8.1)
RBC # BLD AUTO: 4.3 MILLION/UL (ref 3.8–5.1)
SODIUM SERPL-SCNC: 138 MMOL/L (ref 135–146)
TRIGL SERPL-MCNC: 51 MG/DL
TSH SERPL-ACNC: 0.84 MIU/L
VIT B12 SERPL-MCNC: 764 PG/ML (ref 200–1100)
WBC # BLD AUTO: 6.3 THOUSAND/UL (ref 3.8–10.8)

## 2026-03-25 ENCOUNTER — APPOINTMENT (OUTPATIENT)
Dept: OBSTETRICS AND GYNECOLOGY | Facility: CLINIC | Age: 36
End: 2026-03-25
Payer: COMMERCIAL

## 2026-08-24 ENCOUNTER — APPOINTMENT (OUTPATIENT)
Dept: PRIMARY CARE | Facility: CLINIC | Age: 36
End: 2026-08-24
Payer: COMMERCIAL

## 2026-08-26 ENCOUNTER — APPOINTMENT (OUTPATIENT)
Dept: SURGERY | Facility: CLINIC | Age: 36
End: 2026-08-26
Payer: COMMERCIAL